# Patient Record
Sex: FEMALE | Race: BLACK OR AFRICAN AMERICAN | NOT HISPANIC OR LATINO | URBAN - METROPOLITAN AREA
[De-identification: names, ages, dates, MRNs, and addresses within clinical notes are randomized per-mention and may not be internally consistent; named-entity substitution may affect disease eponyms.]

---

## 2018-01-17 ENCOUNTER — EMERGENCY (EMERGENCY)
Facility: HOSPITAL | Age: 18
LOS: 1 days | Discharge: HOME | End: 2018-01-17
Attending: EMERGENCY MEDICINE | Admitting: EMERGENCY MEDICINE
Payer: MEDICAID

## 2018-01-17 VITALS
TEMPERATURE: 98 F | SYSTOLIC BLOOD PRESSURE: 131 MMHG | DIASTOLIC BLOOD PRESSURE: 78 MMHG | RESPIRATION RATE: 16 BRPM | OXYGEN SATURATION: 100 % | HEART RATE: 102 BPM

## 2018-01-17 VITALS
RESPIRATION RATE: 20 BRPM | TEMPERATURE: 99 F | DIASTOLIC BLOOD PRESSURE: 90 MMHG | WEIGHT: 229.94 LBS | HEART RATE: 105 BPM | SYSTOLIC BLOOD PRESSURE: 130 MMHG | OXYGEN SATURATION: 99 %

## 2018-01-17 LAB
ALBUMIN SERPL ELPH-MCNC: 3.9 G/DL — SIGNIFICANT CHANGE UP (ref 3.3–5)
ALP SERPL-CCNC: 56 U/L — SIGNIFICANT CHANGE UP (ref 40–120)
ALT FLD-CCNC: 19 U/L — SIGNIFICANT CHANGE UP (ref 4–33)
AST SERPL-CCNC: 22 U/L — SIGNIFICANT CHANGE UP (ref 4–32)
BASOPHILS # BLD AUTO: 0.04 K/UL — SIGNIFICANT CHANGE UP (ref 0–0.2)
BASOPHILS NFR BLD AUTO: 0.3 % — SIGNIFICANT CHANGE UP (ref 0–2)
BILIRUB SERPL-MCNC: 0.3 MG/DL — SIGNIFICANT CHANGE UP (ref 0.2–1.2)
BUN SERPL-MCNC: 9 MG/DL — SIGNIFICANT CHANGE UP (ref 7–23)
CALCIUM SERPL-MCNC: 8.6 MG/DL — SIGNIFICANT CHANGE UP (ref 8.4–10.5)
CHLORIDE SERPL-SCNC: 102 MMOL/L — SIGNIFICANT CHANGE UP (ref 98–107)
CK MB BLD-MCNC: 0.6 — SIGNIFICANT CHANGE UP (ref 0–2.5)
CK MB BLD-MCNC: 1.71 NG/ML — SIGNIFICANT CHANGE UP (ref 1–4.7)
CK SERPL-CCNC: 276 U/L — HIGH (ref 25–170)
CO2 SERPL-SCNC: 21 MMOL/L — LOW (ref 22–31)
CREAT SERPL-MCNC: 0.92 MG/DL — SIGNIFICANT CHANGE UP (ref 0.5–1.3)
D DIMER BLD IA.RAPID-MCNC: < 150 NG/ML — SIGNIFICANT CHANGE UP
EOSINOPHIL # BLD AUTO: 0.07 K/UL — SIGNIFICANT CHANGE UP (ref 0–0.5)
EOSINOPHIL NFR BLD AUTO: 0.5 % — SIGNIFICANT CHANGE UP (ref 0–6)
GLUCOSE SERPL-MCNC: 78 MG/DL — SIGNIFICANT CHANGE UP (ref 70–99)
HCT VFR BLD CALC: 36 % — SIGNIFICANT CHANGE UP (ref 34.5–45)
HGB BLD-MCNC: 11.9 G/DL — SIGNIFICANT CHANGE UP (ref 11.5–15.5)
IMM GRANULOCYTES # BLD AUTO: 0.03 # — SIGNIFICANT CHANGE UP
IMM GRANULOCYTES NFR BLD AUTO: 0.2 % — SIGNIFICANT CHANGE UP (ref 0–1.5)
LYMPHOCYTES # BLD AUTO: 26.8 % — SIGNIFICANT CHANGE UP (ref 13–44)
LYMPHOCYTES # BLD AUTO: 3.6 K/UL — HIGH (ref 1–3.3)
MCHC RBC-ENTMCNC: 29.9 PG — SIGNIFICANT CHANGE UP (ref 27–34)
MCHC RBC-ENTMCNC: 33.1 % — SIGNIFICANT CHANGE UP (ref 32–36)
MCV RBC AUTO: 90.5 FL — SIGNIFICANT CHANGE UP (ref 80–100)
MONOCYTES # BLD AUTO: 0.67 K/UL — SIGNIFICANT CHANGE UP (ref 0–0.9)
MONOCYTES NFR BLD AUTO: 5 % — SIGNIFICANT CHANGE UP (ref 2–14)
NEUTROPHILS # BLD AUTO: 9.02 K/UL — HIGH (ref 1.8–7.4)
NEUTROPHILS NFR BLD AUTO: 67.2 % — SIGNIFICANT CHANGE UP (ref 43–77)
NRBC # FLD: 0 — SIGNIFICANT CHANGE UP
PLATELET # BLD AUTO: 345 K/UL — SIGNIFICANT CHANGE UP (ref 150–400)
PMV BLD: 9.3 FL — SIGNIFICANT CHANGE UP (ref 7–13)
POTASSIUM SERPL-MCNC: 4.3 MMOL/L — SIGNIFICANT CHANGE UP (ref 3.5–5.3)
POTASSIUM SERPL-SCNC: 4.3 MMOL/L — SIGNIFICANT CHANGE UP (ref 3.5–5.3)
PROT SERPL-MCNC: 7.1 G/DL — SIGNIFICANT CHANGE UP (ref 6–8.3)
RBC # BLD: 3.98 M/UL — SIGNIFICANT CHANGE UP (ref 3.8–5.2)
RBC # FLD: 13.2 % — SIGNIFICANT CHANGE UP (ref 10.3–14.5)
SODIUM SERPL-SCNC: 138 MMOL/L — SIGNIFICANT CHANGE UP (ref 135–145)
TROPONIN T SERPL-MCNC: < 0.06 NG/ML — SIGNIFICANT CHANGE UP (ref 0–0.06)
WBC # BLD: 13.43 K/UL — HIGH (ref 3.8–10.5)
WBC # FLD AUTO: 13.43 K/UL — HIGH (ref 3.8–10.5)

## 2018-01-17 PROCEDURE — 99285 EMERGENCY DEPT VISIT HI MDM: CPT | Mod: 25

## 2018-01-17 PROCEDURE — 93010 ELECTROCARDIOGRAM REPORT: CPT

## 2018-01-17 PROCEDURE — 71046 X-RAY EXAM CHEST 2 VIEWS: CPT | Mod: 26

## 2018-01-17 RX ORDER — AZITHROMYCIN 500 MG/1
1 TABLET, FILM COATED ORAL
Qty: 1 | Refills: 0 | OUTPATIENT
Start: 2018-01-17

## 2018-01-17 RX ORDER — SODIUM CHLORIDE 9 MG/ML
1000 INJECTION INTRAMUSCULAR; INTRAVENOUS; SUBCUTANEOUS ONCE
Qty: 0 | Refills: 0 | Status: COMPLETED | OUTPATIENT
Start: 2018-01-17 | End: 2018-01-17

## 2018-01-17 RX ORDER — ACETAMINOPHEN 500 MG
975 TABLET ORAL ONCE
Qty: 0 | Refills: 0 | Status: COMPLETED | OUTPATIENT
Start: 2018-01-17 | End: 2018-01-17

## 2018-01-17 RX ADMIN — SODIUM CHLORIDE 1000 MILLILITER(S): 9 INJECTION INTRAMUSCULAR; INTRAVENOUS; SUBCUTANEOUS at 11:27

## 2018-01-17 RX ADMIN — Medication 975 MILLIGRAM(S): at 13:40

## 2018-01-17 NOTE — ED PROVIDER NOTE - MEDICAL DECISION MAKING DETAILS
Chest pain, shortness of breath- PERC +, low risk, will do dimer to r/o PE, cbc, cmp, cxr, ekg abnormal, no old

## 2018-01-17 NOTE — ED PROVIDER NOTE - OBJECTIVE STATEMENT
17 year old female, PMHx of thickened endometrium (on medication, unsure of name), presents to the ED complaining of chest pain and shortness of breath. Patient states yesterday evening, she was at rest and had sudden onset substernal chest discomfort associated with shortness of breath. She states the pain is constant, sharp, substernal, non-radiating, pleuritic in nature. It is not associated with exertion. She endorses resting SOB, constant which she attributes to difficulty taking a deep breath due to the pain. She states this morning around 2 am she woke up with the discomfort and sob which was then associated with lightheadedness, chills and a non-productive cough. Pt endorses months of intermittent nausea, no vomiting. The symptoms persisted, prompting her to come to the ED. No h/o personal or family clots, long car rides/travel, tobacco use, fevers, v/d or other complaints. 17 year old female, PMHx of thickened endometrium (on medication, unsure of name), presents to the ED complaining of chest pain and shortness of breath. Patient states yesterday evening, she was at rest and had sudden onset substernal chest discomfort associated with shortness of breath. She states the pain is constant, sharp, substernal, non-radiating, pleuritic in nature. It is not associated with exertion. She endorses resting SOB, constant which she attributes to difficulty taking a deep breath due to the pain. She states this morning around 2 am she woke up with the discomfort and sob which was then associated with lightheadedness, chills and a non-productive cough. Pt endorses months of intermittent nausea, no vomiting. The symptoms persisted, prompting her to come to the ED. No h/o personal or family clots, long car rides/travel, tobacco use, fevers, v/d or other complaints.  Attending - Agree with above.  I evaluated patient myself.  16 y/o F with grandmother at bedside.  c/o SSCP and SOB since approx 2am.  Worse with taking deep breathe.  Never had before.  No recent trauma or illness.  Denies fever.  Noted + cough.  No phlegm.  No rhinorrhea or congestion.  No abd pain.  Reports nausea on/off x approx 1 month.

## 2018-01-17 NOTE — ED PROVIDER NOTE - PHYSICAL EXAMINATION
ATTENDING PHYSICAL EXAM DR. Franz ***GEN - NAD; well appearing; A+O x3 ***HEAD - NC/AT ***EYES/NOSE - PERRL, EOMI, mucous membranes moist, no discharge ***THROAT: Oral cavity and pharynx normal. No inflammation, swelling, exudate, or lesions.  ***NECK: Neck supple, non-tender without lymphadenopathy, no masses, no JVD.   ***PULMONARY - CTA b/l, symmetric breath sounds. ***CARDIAC -s1s2, RRR, no M,R,G  ***ABDOMEN - +BS, ND, NT, soft, no guarding, no rebound, no masses   ***BACK - no CVA tenderness, Normal  spine ***EXTREMITIES - symmetric pulses, 2+ dp, capillary refill < 2 seconds, no clubbing, no cyanosis, no edema ***SKIN - no rash or bruising   ***NEUROLOGIC - alert, CN 2-12 intact

## 2018-01-17 NOTE — ED ADULT NURSE NOTE - OBJECTIVE STATEMENT
Pt rec'd to ED R#26 Aox4, in NAD, c/o midsternal chest pressure x2 days with dry cough. States chest pressure worse with ambulation. Also c/o dizziness, HA, nausea. Denies vomiting/ SOB/ abd pain/ dysuria/ fevers/ chills/ nasal congestion/ other acute complaints. VSS. Awaiting attg eval. Mom at bedside. Will continue to monitor.

## 2018-01-18 ENCOUNTER — EMERGENCY (EMERGENCY)
Facility: HOSPITAL | Age: 18
LOS: 1 days | Discharge: HOME | End: 2018-01-18
Attending: EMERGENCY MEDICINE | Admitting: EMERGENCY MEDICINE
Payer: MEDICAID

## 2018-01-18 VITALS
DIASTOLIC BLOOD PRESSURE: 101 MMHG | OXYGEN SATURATION: 100 % | HEART RATE: 124 BPM | SYSTOLIC BLOOD PRESSURE: 159 MMHG | TEMPERATURE: 101 F | RESPIRATION RATE: 16 BRPM

## 2018-01-18 VITALS
DIASTOLIC BLOOD PRESSURE: 53 MMHG | TEMPERATURE: 99 F | HEART RATE: 113 BPM | RESPIRATION RATE: 24 BRPM | OXYGEN SATURATION: 100 % | SYSTOLIC BLOOD PRESSURE: 126 MMHG

## 2018-01-18 LAB
ALBUMIN SERPL ELPH-MCNC: 4 G/DL — SIGNIFICANT CHANGE UP (ref 3.3–5)
ALP SERPL-CCNC: 58 U/L — SIGNIFICANT CHANGE UP (ref 40–120)
ALT FLD-CCNC: 20 U/L — SIGNIFICANT CHANGE UP (ref 4–33)
AST SERPL-CCNC: 25 U/L — SIGNIFICANT CHANGE UP (ref 4–32)
BASE EXCESS BLDV CALC-SCNC: 1.7 MMOL/L — SIGNIFICANT CHANGE UP
BASOPHILS # BLD AUTO: 0.03 K/UL — SIGNIFICANT CHANGE UP (ref 0–0.2)
BASOPHILS NFR BLD AUTO: 0.2 % — SIGNIFICANT CHANGE UP (ref 0–2)
BILIRUB SERPL-MCNC: 0.2 MG/DL — SIGNIFICANT CHANGE UP (ref 0.2–1.2)
BLOOD GAS VENOUS - CREATININE: 0.94 MG/DL — SIGNIFICANT CHANGE UP (ref 0.5–1.3)
BUN SERPL-MCNC: 8 MG/DL — SIGNIFICANT CHANGE UP (ref 7–23)
CALCIUM SERPL-MCNC: 8.7 MG/DL — SIGNIFICANT CHANGE UP (ref 8.4–10.5)
CHLORIDE BLDV-SCNC: 108 MMOL/L — SIGNIFICANT CHANGE UP (ref 96–108)
CHLORIDE SERPL-SCNC: 101 MMOL/L — SIGNIFICANT CHANGE UP (ref 98–107)
CK MB BLD-MCNC: 0.5 — SIGNIFICANT CHANGE UP (ref 0–2.5)
CK MB BLD-MCNC: 1.27 NG/ML — SIGNIFICANT CHANGE UP (ref 1–4.7)
CK SERPL-CCNC: 232 U/L — HIGH (ref 25–170)
CO2 SERPL-SCNC: 22 MMOL/L — SIGNIFICANT CHANGE UP (ref 22–31)
CREAT SERPL-MCNC: 0.9 MG/DL — SIGNIFICANT CHANGE UP (ref 0.5–1.3)
EOSINOPHIL # BLD AUTO: 0.05 K/UL — SIGNIFICANT CHANGE UP (ref 0–0.5)
EOSINOPHIL NFR BLD AUTO: 0.4 % — SIGNIFICANT CHANGE UP (ref 0–6)
GAS PNL BLDV: 131 MMOL/L — LOW (ref 136–146)
GLUCOSE BLDV-MCNC: 112 — HIGH (ref 70–99)
GLUCOSE SERPL-MCNC: 103 MG/DL — HIGH (ref 70–99)
HCO3 BLDV-SCNC: 25 MMOL/L — SIGNIFICANT CHANGE UP (ref 20–27)
HCT VFR BLD CALC: 36.3 % — SIGNIFICANT CHANGE UP (ref 34.5–45)
HCT VFR BLDV CALC: 36.7 % — SIGNIFICANT CHANGE UP (ref 34.5–45)
HGB BLD-MCNC: 11.8 G/DL — SIGNIFICANT CHANGE UP (ref 11.5–15.5)
HGB BLDV-MCNC: 11.9 G/DL — SIGNIFICANT CHANGE UP (ref 11.5–15.5)
IMM GRANULOCYTES # BLD AUTO: 0.04 # — SIGNIFICANT CHANGE UP
IMM GRANULOCYTES NFR BLD AUTO: 0.3 % — SIGNIFICANT CHANGE UP (ref 0–1.5)
LACTATE BLDV-MCNC: 1.3 MMOL/L — SIGNIFICANT CHANGE UP (ref 0.5–2)
LYMPHOCYTES # BLD AUTO: 1.87 K/UL — SIGNIFICANT CHANGE UP (ref 1–3.3)
LYMPHOCYTES # BLD AUTO: 13.6 % — SIGNIFICANT CHANGE UP (ref 13–44)
MCHC RBC-ENTMCNC: 28.6 PG — SIGNIFICANT CHANGE UP (ref 27–34)
MCHC RBC-ENTMCNC: 32.5 % — SIGNIFICANT CHANGE UP (ref 32–36)
MCV RBC AUTO: 88.1 FL — SIGNIFICANT CHANGE UP (ref 80–100)
MONOCYTES # BLD AUTO: 0.59 K/UL — SIGNIFICANT CHANGE UP (ref 0–0.9)
MONOCYTES NFR BLD AUTO: 4.3 % — SIGNIFICANT CHANGE UP (ref 2–14)
NEUTROPHILS # BLD AUTO: 11.14 K/UL — HIGH (ref 1.8–7.4)
NEUTROPHILS NFR BLD AUTO: 81.2 % — HIGH (ref 43–77)
NRBC # FLD: 0 — SIGNIFICANT CHANGE UP
PCO2 BLDV: 45 MMHG — SIGNIFICANT CHANGE UP (ref 41–51)
PH BLDV: 7.39 PH — SIGNIFICANT CHANGE UP (ref 7.32–7.43)
PLATELET # BLD AUTO: 351 K/UL — SIGNIFICANT CHANGE UP (ref 150–400)
PMV BLD: 9 FL — SIGNIFICANT CHANGE UP (ref 7–13)
PO2 BLDV: 33 MMHG — LOW (ref 35–40)
POTASSIUM BLDV-SCNC: 3.6 MMOL/L — SIGNIFICANT CHANGE UP (ref 3.4–4.5)
POTASSIUM SERPL-MCNC: 4 MMOL/L — SIGNIFICANT CHANGE UP (ref 3.5–5.3)
POTASSIUM SERPL-SCNC: 4 MMOL/L — SIGNIFICANT CHANGE UP (ref 3.5–5.3)
PROT SERPL-MCNC: 7.4 G/DL — SIGNIFICANT CHANGE UP (ref 6–8.3)
RBC # BLD: 4.12 M/UL — SIGNIFICANT CHANGE UP (ref 3.8–5.2)
RBC # FLD: 13.2 % — SIGNIFICANT CHANGE UP (ref 10.3–14.5)
SAO2 % BLDV: 56.7 % — LOW (ref 60–85)
SODIUM SERPL-SCNC: 137 MMOL/L — SIGNIFICANT CHANGE UP (ref 135–145)
TROPONIN T SERPL-MCNC: < 0.06 NG/ML — SIGNIFICANT CHANGE UP (ref 0–0.06)
WBC # BLD: 13.72 K/UL — HIGH (ref 3.8–10.5)
WBC # FLD AUTO: 13.72 K/UL — HIGH (ref 3.8–10.5)

## 2018-01-18 PROCEDURE — 93010 ELECTROCARDIOGRAM REPORT: CPT

## 2018-01-18 PROCEDURE — 99284 EMERGENCY DEPT VISIT MOD MDM: CPT | Mod: 25

## 2018-01-18 RX ORDER — IPRATROPIUM/ALBUTEROL SULFATE 18-103MCG
3 AEROSOL WITH ADAPTER (GRAM) INHALATION ONCE
Qty: 0 | Refills: 0 | Status: COMPLETED | OUTPATIENT
Start: 2018-01-18 | End: 2018-01-18

## 2018-01-18 RX ORDER — SODIUM CHLORIDE 9 MG/ML
2000 INJECTION INTRAMUSCULAR; INTRAVENOUS; SUBCUTANEOUS ONCE
Qty: 0 | Refills: 0 | Status: COMPLETED | OUTPATIENT
Start: 2018-01-18 | End: 2018-01-18

## 2018-01-18 RX ORDER — KETOROLAC TROMETHAMINE 30 MG/ML
15 SYRINGE (ML) INJECTION ONCE
Qty: 0 | Refills: 0 | Status: DISCONTINUED | OUTPATIENT
Start: 2018-01-18 | End: 2018-01-18

## 2018-01-18 RX ORDER — ALBUTEROL 90 UG/1
2 AEROSOL, METERED ORAL
Qty: 1 | Refills: 0 | OUTPATIENT
Start: 2018-01-18 | End: 2018-02-16

## 2018-01-18 RX ORDER — METOCLOPRAMIDE HCL 10 MG
10 TABLET ORAL ONCE
Qty: 0 | Refills: 0 | Status: COMPLETED | OUTPATIENT
Start: 2018-01-18 | End: 2018-01-18

## 2018-01-18 RX ORDER — SODIUM CHLORIDE 9 MG/ML
1000 INJECTION INTRAMUSCULAR; INTRAVENOUS; SUBCUTANEOUS ONCE
Qty: 0 | Refills: 0 | Status: COMPLETED | OUTPATIENT
Start: 2018-01-18 | End: 2018-01-18

## 2018-01-18 RX ADMIN — SODIUM CHLORIDE 2000 MILLILITER(S): 9 INJECTION INTRAMUSCULAR; INTRAVENOUS; SUBCUTANEOUS at 05:57

## 2018-01-18 RX ADMIN — SODIUM CHLORIDE 1000 MILLILITER(S): 9 INJECTION INTRAMUSCULAR; INTRAVENOUS; SUBCUTANEOUS at 07:33

## 2018-01-18 RX ADMIN — Medication 15 MILLIGRAM(S): at 06:14

## 2018-01-18 RX ADMIN — Medication 10 MILLIGRAM(S): at 05:57

## 2018-01-18 RX ADMIN — Medication 3 MILLILITER(S): at 06:02

## 2018-01-18 RX ADMIN — Medication 15 MILLIGRAM(S): at 05:57

## 2018-01-18 RX ADMIN — Medication 3 MILLILITER(S): at 06:14

## 2018-01-18 RX ADMIN — Medication 3 MILLILITER(S): at 06:08

## 2018-01-18 NOTE — ED ADULT NURSE NOTE - CHIEF COMPLAINT
The patient is a 17y Female complaining of fever x1 day, SOB, midsternal chest pain x3 days. Reports was seen in ED yesterday for similar complaint and was prescribed z-pack, and took x1 pill. Reports took tylenol 1000mg around 3am.

## 2018-01-18 NOTE — ED PROVIDER NOTE - PROGRESS NOTE DETAILS
IRMA Ryan: Pt signed out to me by overnight team, 17yF w/no pmhx dx yesterday with possible pneumonia started on azithromycin, she felt worse today with cough and sob, currently tachycardic, afebrile, EKG with TWI in precordial leads, will continue to monitor. Pt may require admission vs.CDU [place]: Pt now asymptomatic. SOB and CP resolved. Still slight tachycardia but improved. Will continue to hydrate. If remains feeling much better then can go home w/ outpt pmd/cards f/u, albuterol inhaler prn. Symptoms improved. Augmentin, inhaler and steriods sent to pharmacy.

## 2018-01-18 NOTE — ED ADULT NURSE NOTE - CHIEF COMPLAINT QUOTE
pt arrives w/ flu-like symptoms. pt states was here yesterday for same complaint and gvn a z-pack. pt states started pack and took one pill already but states symptoms getting worst.

## 2018-01-18 NOTE — ED ADULT NURSE NOTE - OBJECTIVE STATEMENT
Received pt in room 23, ambulatory, pt A&Ox4, respirations even and slightly labored b/l. Abdomen soft, nondistended, nontender. Sinus tach on cardiac monitor. IVL 20g Angiocath placed on left forearm. Awaiting MD bernal. Will continue to monitor.

## 2018-01-18 NOTE — ED PROVIDER NOTE - OBJECTIVE STATEMENT
18 yo woman p/w cough. Developed cough 3 days ago, non productive. Was seen in ED yesterday, had CXR w/ ?PNA and was given azithromycin (took first dose only). States since then she has gotten worse, now w/ fever, diffuse body aches and fevers. Feels nauseous w/ mild frontal headache. No vomiting or diarrhea. 18 yo woman p/w cough. Developed cough 3 days ago, non productive. Was seen in ED yesterday, had CXR w/ ?PNA and was given azithromycin (took first dose only). States since then she has gotten worse, now w/ fever, diffuse body aches and fevers. Feels nauseous w/ mild frontal headache. No vomiting or diarrhea.  [place]: 17F no PMH p/w cp/sob/fevers. For 3d, has non-productive cough, body aches, generalized intermittent gradual onset HAs. Seen in ED yesterday, slight leukocytosis, other labs (including d-dimer) wnl. CXR showing possible infiltrate, dc'd on z-pack. Returns today for worsening midsternal cp and SOB. Also now has fevers. Denies LE pain/swelling, black/bloody stool, rashes, sick contacts, recent travel. Tylenol 1g ~1hr PTA.

## 2018-01-18 NOTE — ED PROVIDER NOTE - ATTENDING CONTRIBUTION TO CARE
17F no PMH p/w cp/sob/fevers. For 3d, has non-productive cough, body aches, generalized intermittent gradual onset HAs. Seen in ED yesterday, slight leukocytosis, other labs wnl. CXR showing possible infiltrate, dc'd on z-pack. Returns today for worsening midsternal cp and SOB. Also now has fevers. Tachycardic and febrile, also slight tachypnea, other vitals wnl. Exam as above. EKG sinus tach w/ twi.  ddx: Likely URI vs. PNa vs. low suspicion for myocarditis.  Given EKG, pt's symptoms and possible ultrasound finding, will check ck/troponin. Basic labs, vbg comp. Symptom control, trial nebs, reassess.   If symptoms do not resolve, may require admission for further w/u and supportive care.

## 2018-08-13 ENCOUNTER — EMERGENCY (EMERGENCY)
Facility: HOSPITAL | Age: 18
LOS: 0 days | Discharge: HOME | End: 2018-08-14
Attending: EMERGENCY MEDICINE
Payer: MEDICAID

## 2018-08-13 VITALS
WEIGHT: 229.94 LBS | HEIGHT: 65 IN | SYSTOLIC BLOOD PRESSURE: 140 MMHG | OXYGEN SATURATION: 99 % | DIASTOLIC BLOOD PRESSURE: 78 MMHG | TEMPERATURE: 98 F | HEART RATE: 99 BPM | RESPIRATION RATE: 18 BRPM

## 2018-08-13 DIAGNOSIS — M54.12 RADICULOPATHY, CERVICAL REGION: ICD-10-CM

## 2018-08-13 DIAGNOSIS — R20.0 ANESTHESIA OF SKIN: ICD-10-CM

## 2018-08-13 PROCEDURE — 99284 EMERGENCY DEPT VISIT MOD MDM: CPT

## 2018-08-13 RX ORDER — IBUPROFEN 200 MG
600 TABLET ORAL ONCE
Qty: 0 | Refills: 0 | Status: COMPLETED | OUTPATIENT
Start: 2018-08-13 | End: 2018-08-13

## 2018-08-13 RX ORDER — METHOCARBAMOL 500 MG/1
1500 TABLET, FILM COATED ORAL ONCE
Qty: 0 | Refills: 0 | Status: COMPLETED | OUTPATIENT
Start: 2018-08-13 | End: 2018-08-13

## 2018-08-13 NOTE — ED PROVIDER NOTE - OBJECTIVE STATEMENT
18 year old female with no significant PMH presenting to ED due to new onset neck/shoulder pain with some numbness radiating down bilateral hands - states occurred while she was packing some things away - denies any trauma, no arm weakness. Denies any headache or other focal weakness/numbness.

## 2018-08-13 NOTE — ED PROVIDER NOTE - NEUROLOGICAL, MLM
Alert and oriented, no focal deficits, no motor or sensory deficits. 5/5 bilateral arm strength normal, NIHSS 0.

## 2018-08-14 VITALS
RESPIRATION RATE: 18 BRPM | OXYGEN SATURATION: 99 % | DIASTOLIC BLOOD PRESSURE: 78 MMHG | SYSTOLIC BLOOD PRESSURE: 131 MMHG | HEART RATE: 71 BPM | TEMPERATURE: 98 F

## 2018-08-14 PROCEDURE — 76377 3D RENDER W/INTRP POSTPROCES: CPT | Mod: 26

## 2018-08-14 PROCEDURE — 72125 CT NECK SPINE W/O DYE: CPT | Mod: 26

## 2018-08-14 RX ORDER — METHOCARBAMOL 500 MG/1
2 TABLET, FILM COATED ORAL
Qty: 30 | Refills: 0 | OUTPATIENT
Start: 2018-08-14 | End: 2018-08-18

## 2018-08-14 RX ORDER — IBUPROFEN 200 MG
1 TABLET ORAL
Qty: 20 | Refills: 0 | OUTPATIENT
Start: 2018-08-14 | End: 2018-08-18

## 2018-08-14 RX ADMIN — METHOCARBAMOL 1500 MILLIGRAM(S): 500 TABLET, FILM COATED ORAL at 00:10

## 2018-08-14 RX ADMIN — Medication 600 MILLIGRAM(S): at 00:10

## 2018-08-14 NOTE — ED ADULT NURSE NOTE - OBJECTIVE STATEMENT
Patient complains of sharp pain and numbness in the hand, primarily in the thumbs. Patient reports sensation went up the arms into shoulders. Denies chest pain and shortness of breath.

## 2018-08-14 NOTE — ED ADULT NURSE NOTE - NSIMPLEMENTINTERV_GEN_ALL_ED
Implemented All Universal Safety Interventions:  [place] to call system. Call bell, personal items and telephone within reach. Instruct patient to call for assistance. Room bathroom lighting operational. Non-slip footwear when patient is off stretcher. Physically safe environment: no spills, clutter or unnecessary equipment. Stretcher in lowest position, wheels locked, appropriate side rails in place.

## 2018-08-31 ENCOUNTER — INPATIENT (INPATIENT)
Facility: HOSPITAL | Age: 18
LOS: 1 days | Discharge: HOME | End: 2018-09-02
Attending: HOSPITALIST | Admitting: HOSPITALIST
Payer: MEDICAID

## 2018-08-31 VITALS
DIASTOLIC BLOOD PRESSURE: 76 MMHG | RESPIRATION RATE: 16 BRPM | HEART RATE: 93 BPM | TEMPERATURE: 99 F | SYSTOLIC BLOOD PRESSURE: 123 MMHG | OXYGEN SATURATION: 98 %

## 2018-08-31 DIAGNOSIS — R25.2 CRAMP AND SPASM: ICD-10-CM

## 2018-08-31 DIAGNOSIS — Z29.9 ENCOUNTER FOR PROPHYLACTIC MEASURES, UNSPECIFIED: ICD-10-CM

## 2018-08-31 DIAGNOSIS — L83 ACANTHOSIS NIGRICANS: ICD-10-CM

## 2018-08-31 DIAGNOSIS — M62.89 OTHER SPECIFIED DISORDERS OF MUSCLE: ICD-10-CM

## 2018-08-31 LAB
ALBUMIN SERPL ELPH-MCNC: 4.4 G/DL — SIGNIFICANT CHANGE UP (ref 3.3–5)
ALP SERPL-CCNC: 57 U/L — SIGNIFICANT CHANGE UP (ref 40–120)
ALT FLD-CCNC: 17 U/L — SIGNIFICANT CHANGE UP (ref 4–33)
AMPHET UR-MCNC: NEGATIVE — SIGNIFICANT CHANGE UP
APPEARANCE UR: SIGNIFICANT CHANGE UP
AST SERPL-CCNC: 29 U/L — SIGNIFICANT CHANGE UP (ref 4–32)
BACTERIA # UR AUTO: SIGNIFICANT CHANGE UP
BARBITURATES UR SCN-MCNC: NEGATIVE — SIGNIFICANT CHANGE UP
BENZODIAZ UR-MCNC: NEGATIVE — SIGNIFICANT CHANGE UP
BILIRUB SERPL-MCNC: 0.2 MG/DL — SIGNIFICANT CHANGE UP (ref 0.2–1.2)
BILIRUB UR-MCNC: NEGATIVE — SIGNIFICANT CHANGE UP
BLOOD UR QL VISUAL: HIGH
BUN SERPL-MCNC: 13 MG/DL — SIGNIFICANT CHANGE UP (ref 7–23)
CA-I BLD-SCNC: 1.19 MMOL/L — SIGNIFICANT CHANGE UP (ref 1.03–1.23)
CALCIUM SERPL-MCNC: 9.3 MG/DL — SIGNIFICANT CHANGE UP (ref 8.4–10.5)
CANNABINOIDS UR-MCNC: NEGATIVE — SIGNIFICANT CHANGE UP
CHLORIDE SERPL-SCNC: 104 MMOL/L — SIGNIFICANT CHANGE UP (ref 98–107)
CK SERPL-CCNC: 1096 U/L — HIGH (ref 25–170)
CO2 SERPL-SCNC: 24 MMOL/L — SIGNIFICANT CHANGE UP (ref 22–31)
COCAINE METAB.OTHER UR-MCNC: NEGATIVE — SIGNIFICANT CHANGE UP
COLOR SPEC: YELLOW — SIGNIFICANT CHANGE UP
CREAT SERPL-MCNC: 0.93 MG/DL — SIGNIFICANT CHANGE UP (ref 0.5–1.3)
CRP SERPL-MCNC: 4.8 MG/L — SIGNIFICANT CHANGE UP
ERYTHROCYTE [SEDIMENTATION RATE] IN BLOOD: 16 MM/HR — SIGNIFICANT CHANGE UP (ref 4–25)
GLUCOSE SERPL-MCNC: 67 MG/DL — LOW (ref 70–99)
GLUCOSE UR-MCNC: NEGATIVE — SIGNIFICANT CHANGE UP
HCT VFR BLD CALC: 35.9 % — SIGNIFICANT CHANGE UP (ref 34.5–45)
HGB BLD-MCNC: 11.7 G/DL — SIGNIFICANT CHANGE UP (ref 11.5–15.5)
HYALINE CASTS # UR AUTO: HIGH
KETONES UR-MCNC: HIGH
LEUKOCYTE ESTERASE UR-ACNC: SIGNIFICANT CHANGE UP
MAGNESIUM SERPL-MCNC: 2.1 MG/DL — SIGNIFICANT CHANGE UP (ref 1.6–2.6)
MCHC RBC-ENTMCNC: 28.7 PG — SIGNIFICANT CHANGE UP (ref 27–34)
MCHC RBC-ENTMCNC: 32.6 % — SIGNIFICANT CHANGE UP (ref 32–36)
MCV RBC AUTO: 88 FL — SIGNIFICANT CHANGE UP (ref 80–100)
METHADONE UR-MCNC: NEGATIVE — SIGNIFICANT CHANGE UP
NITRITE UR-MCNC: NEGATIVE — SIGNIFICANT CHANGE UP
NRBC # FLD: 0 — SIGNIFICANT CHANGE UP
OPIATES UR-MCNC: NEGATIVE — SIGNIFICANT CHANGE UP
OXYCODONE UR-MCNC: NEGATIVE — SIGNIFICANT CHANGE UP
PCP UR-MCNC: NEGATIVE — SIGNIFICANT CHANGE UP
PH UR: 6 — SIGNIFICANT CHANGE UP (ref 5–8)
PLATELET # BLD AUTO: 353 K/UL — SIGNIFICANT CHANGE UP (ref 150–400)
PMV BLD: 8.9 FL — SIGNIFICANT CHANGE UP (ref 7–13)
POTASSIUM SERPL-MCNC: 4.1 MMOL/L — SIGNIFICANT CHANGE UP (ref 3.5–5.3)
POTASSIUM SERPL-SCNC: 4.1 MMOL/L — SIGNIFICANT CHANGE UP (ref 3.5–5.3)
PROT SERPL-MCNC: 7.7 G/DL — SIGNIFICANT CHANGE UP (ref 6–8.3)
PROT UR-MCNC: HIGH
RBC # BLD: 4.08 M/UL — SIGNIFICANT CHANGE UP (ref 3.8–5.2)
RBC # FLD: 13.6 % — SIGNIFICANT CHANGE UP (ref 10.3–14.5)
RBC CASTS # UR COMP ASSIST: HIGH (ref 0–?)
SODIUM SERPL-SCNC: 140 MMOL/L — SIGNIFICANT CHANGE UP (ref 135–145)
SP GR SPEC: 1.04 — SIGNIFICANT CHANGE UP (ref 1–1.04)
SQUAMOUS # UR AUTO: SIGNIFICANT CHANGE UP
TSH SERPL-MCNC: 1.55 UIU/ML — SIGNIFICANT CHANGE UP (ref 0.27–4.2)
UROBILINOGEN FLD QL: NORMAL — SIGNIFICANT CHANGE UP
WBC # BLD: 6.83 K/UL — SIGNIFICANT CHANGE UP (ref 3.8–10.5)
WBC # FLD AUTO: 6.83 K/UL — SIGNIFICANT CHANGE UP (ref 3.8–10.5)
WBC UR QL: >50 — HIGH (ref 0–?)

## 2018-08-31 PROCEDURE — 72141 MRI NECK SPINE W/O DYE: CPT | Mod: 26

## 2018-08-31 PROCEDURE — 70553 MRI BRAIN STEM W/O & W/DYE: CPT | Mod: 26

## 2018-08-31 PROCEDURE — 93971 EXTREMITY STUDY: CPT | Mod: 26,LT

## 2018-08-31 PROCEDURE — 99223 1ST HOSP IP/OBS HIGH 75: CPT

## 2018-08-31 RX ORDER — ALBUTEROL 90 UG/1
2 AEROSOL, METERED ORAL
Qty: 0 | Refills: 0 | COMMUNITY

## 2018-08-31 RX ORDER — SODIUM CHLORIDE 9 MG/ML
1000 INJECTION INTRAMUSCULAR; INTRAVENOUS; SUBCUTANEOUS ONCE
Qty: 0 | Refills: 0 | Status: COMPLETED | OUTPATIENT
Start: 2018-08-31 | End: 2018-08-31

## 2018-08-31 RX ORDER — CHOLECALCIFEROL (VITAMIN D3) 125 MCG
2000 CAPSULE ORAL DAILY
Qty: 0 | Refills: 0 | Status: DISCONTINUED | OUTPATIENT
Start: 2018-08-31 | End: 2018-09-02

## 2018-08-31 RX ORDER — ERGOCALCIFEROL 1.25 MG/1
1 CAPSULE ORAL
Qty: 0 | Refills: 0 | COMMUNITY

## 2018-08-31 RX ORDER — BACLOFEN 100 %
10 POWDER (GRAM) MISCELLANEOUS ONCE
Qty: 0 | Refills: 0 | Status: COMPLETED | OUTPATIENT
Start: 2018-08-31 | End: 2018-08-31

## 2018-08-31 RX ORDER — KETOROLAC TROMETHAMINE 30 MG/ML
15 SYRINGE (ML) INJECTION ONCE
Qty: 0 | Refills: 0 | Status: DISCONTINUED | OUTPATIENT
Start: 2018-08-31 | End: 2018-08-31

## 2018-08-31 RX ORDER — ENOXAPARIN SODIUM 100 MG/ML
40 INJECTION SUBCUTANEOUS EVERY 24 HOURS
Qty: 0 | Refills: 0 | Status: DISCONTINUED | OUTPATIENT
Start: 2018-08-31 | End: 2018-09-01

## 2018-08-31 RX ADMIN — Medication 10 MILLIGRAM(S): at 18:14

## 2018-08-31 RX ADMIN — SODIUM CHLORIDE 1000 MILLILITER(S): 9 INJECTION INTRAMUSCULAR; INTRAVENOUS; SUBCUTANEOUS at 17:44

## 2018-08-31 RX ADMIN — Medication 2000 UNIT(S): at 21:42

## 2018-08-31 RX ADMIN — SODIUM CHLORIDE 1000 MILLILITER(S): 9 INJECTION INTRAMUSCULAR; INTRAVENOUS; SUBCUTANEOUS at 22:15

## 2018-08-31 RX ADMIN — Medication 15 MILLIGRAM(S): at 15:28

## 2018-08-31 RX ADMIN — ENOXAPARIN SODIUM 40 MILLIGRAM(S): 100 INJECTION SUBCUTANEOUS at 21:42

## 2018-08-31 NOTE — CONSULT NOTE ADULT - SUBJECTIVE AND OBJECTIVE BOX
Neurology Consult    Reason for consult: L leg cramps    HPI: Patient is a 18 year old AA female presenting with left leg cramps. Patient has no PMH, taking only vitamin D. States she woke up this morning with cramping in the left calf. She tried to stand up and felt severe stabbing pain in the left calf. Reports that 2 weeks ago, she was at work when her left thumb contracted and she felt the same severe stabbing pain in the forearm. This lasted about 30 minutes and then the same thing happened to the right arm. CT cervical spine done at that time was unremarkable. Patient denies any recent travel, exercise, smoking, alcohol, drug use, numbness, tingling, weakness, changes in vision, back pain.    REVIEW OF SYSTEMS:  Constitutional: No fever, chills, fatigue, weakness.  Eyes: No eye pain, visual disturbances, or discharge.  ENT:  No difficulty hearing, tinnitus, vertigo. No sinus or throat pain.  Neck: No pain or stiffness.  Respiratory: No cough, dyspnea, wheezing.  Cardiovascular: No chest pain, palpitations.  Gastrointestinal: No abdominal pain. No nausea, vomiting, diarrhea, or constipation.   Genitourinary: No dysuria, frequency, hematuria or incontinence.  Neurological: No headaches, lightheadedness, vertigo, numbness or tremors.  Psychiatric: No depression, anxiety, mood swings, or difficulty sleeping.  Musculoskeletal: No joint pain or swelling. No muscle, back. Left leg pain.  Skin: No itching, burning, rashes or lesions.   Lymph Nodes: No enlarged glands  Endocrine: No heat or cold intolerance, no hair loss.  Allergy and Immunologic: No hives or eczema.    MEDICATIONS    PMH: No pertinent past medical history     PSH: No significant past surgical history    FAMILY HISTORY:  No history of dementia, strokes, or seizures     SOCIAL HISTORY:  No history of tobacco or alcohol use     Allergies  No Known Drug Allergies  seasonal (Rhinitis; Rhinorrhea; Eye Irritation)    Vital Signs Last 24 Hrs  T(C): 36.4 (31 Aug 2018 16:28), Max: 37.3 (31 Aug 2018 13:50)  T(F): 97.6 (31 Aug 2018 16:28), Max: 99.2 (31 Aug 2018 13:50)  HR: 74 (31 Aug 2018 16:28) (74 - 93)  BP: 113/71 (31 Aug 2018 16:28) (113/71 - 123/76)  RR: 16 (31 Aug 2018 16:28) (16 - 16)  SpO2: 99% (31 Aug 2018 16:28) (98% - 99%)    Neurological Examination:    Mental Status: Patient is alert, awake, oriented X3. Patient is fluent, no dysarthria, no aphasia. Follows commands well and able to answer questions appropriately. Mood and affect normal.    Cranial Nerves: PERRL, EOMI, visual field intact, V1-V3 intact, no gross facial asymmetry, tongue/uvula midline    Motor Exam: No drift  Right upper extremity: 5/5  Left upper extremity: 5/5  Right lower extremity: 5/5  Left lower extremity: 5/5    Normal bulk/tone    Sensory: Intact to light touch and pinprick bilaterally. No extinction.     Coordination: Finger to nose intact bilaterally     Gait: Unable to walk due to pain in left leg    Reflexes: Bilateral 2+ Biceps, Brachial, Patellar, Ankle  Plantar: Down bilateral    GENERAL: No acute distress  HEENT:  Normocephalic, atraumatic  EXTREMITIES: No edema, clubbing, cyanosis  MUSCULOSKELETAL: Normal range of motion. No erythema or swelling. Left calf tender to palpation. Pain with foot dorsiflexion.  SKIN: No rashes    LABS:  CBC Full  -  ( 31 Aug 2018 14:45 )  WBC Count : 6.83 K/uL  Hemoglobin : 11.7 g/dL  Hematocrit : 35.9 %  Platelet Count - Automated : 353 K/uL  Mean Cell Volume : 88.0 fL  Mean Cell Hemoglobin : 28.7 pg  Mean Cell Hemoglobin Concentration : 32.6 %    08-31    140  |  104  |  13  ----------------------------<  67<L>  4.1   |  24  |  0.93    Ca    9.3      31 Aug 2018 14:45  Mg     2.1     08-31    TPro  7.7  /  Alb  4.4  /  TBili  0.2  /  DBili  x   /  AST  29  /  ALT  17  /  AlkPhos  57  08-31    LIVER FUNCTIONS - ( 31 Aug 2018 14:45 )  Alb: 4.4 g/dL / Pro: 7.7 g/dL / ALK PHOS: 57 u/L / ALT: 17 u/L / AST: 29 u/L / GGT: x

## 2018-08-31 NOTE — ED PROVIDER NOTE - OBJECTIVE STATEMENT
18 year old female c/o spasm of left leg , unable to walk, had episode of hand spasm last week with normal ct cervical spine. no hx of injury, no drug or medicine use, no numbness.

## 2018-08-31 NOTE — ED ADULT NURSE NOTE - OBJECTIVE STATEMENT
pt states she developed cramp in lt leg at 9am this am. states pain in lt leg is severe when she tries to walk. denies trauma. sl placed labs sent. ucg done, medicated as ordered to RW.

## 2018-08-31 NOTE — ED ADULT TRIAGE NOTE - CHIEF COMPLAINT QUOTE
A&OX3 arrives in wheelchair c/o left leg pain that began this morning, pt states over past two weeks has been having intermittent muscles spasms throughout her whole body but this morning pain started in left leg and is now unable to walk and stand, denies numbness and tingling recent fall or trauma to area no swelling noted to lower leg area

## 2018-08-31 NOTE — H&P ADULT - PROBLEM SELECTOR PLAN 2
darkening of skin over posterior neck  -will check a1c in the am Darkening of skin over posterior neck, concerning for insulin resistance and/or metabolic syndrome  -will check HgbA1c in the AM

## 2018-08-31 NOTE — H&P ADULT - PROBLEM SELECTOR PLAN 4
- VTE ppx: No pharmacologic ppx, as pt is low risk with IMPROVE score 0. Encourage OOB and ambulation.  IMPROVE VTE Individual Risk Assessment  RISK                                                                Points  [  ] Previous VTE                                                  3  [  ] Thrombophilia                                               2  [  ] Lower limb paralysis                                      2        (unable to hold up >15 seconds)    [  ] Current Cancer                                              2         (within 6 months)  [  ] Immobilization > 24 hrs                                1  [  ] ICU/CCU stay > 24 hours                              1  [  ] Age > 60                                                      1  IMPROVE VTE Score ____0____    - Diet: regular    OTTO Mckeon MD-PGY2  Internal Medicine Department  Pager: [phone] / [phone]

## 2018-08-31 NOTE — ED ADULT NURSE NOTE - NSIMPLEMENTINTERV_GEN_ALL_ED
Implemented All Universal Safety Interventions:  [place] to call system. Call bell, personal items and telephone within reach. Instruct patient to call for assistance. Room bathroom lighting operational. Non-slip footwear when patient is off stretcher. Physically safe environment: no spills, clutter or unnecessary equipment. Stretcher in lowest position, wheels locked, appropriate side rails in place.

## 2018-08-31 NOTE — H&P ADULT - PROBLEM SELECTOR PROBLEM 1
Problem: Patient Care Overview (Adult)  Goal: Adult Individualization and Mutuality  Outcome: Ongoing (interventions implemented as appropriate)       [place] Muscle cramp

## 2018-08-31 NOTE — H&P ADULT - NSHPPHYSICALEXAM_GEN_ALL_CORE
T(C): 36.4 (08-31-18 @ 16:28), Max: 37.3 (08-31-18 @ 13:50)  HR: 74 (08-31-18 @ 16:28) (74 - 93)  BP: 113/71 (08-31-18 @ 16:28) (113/71 - 123/76)  RR: 16 (08-31-18 @ 16:28) (16 - 16)  SpO2: 99% (08-31-18 @ 16:28) (98% - 99%)    GENERAL: NAD, well-developed  HEAD:  Atraumatic, Normocephalic  EYES: EOMI, PERRLA, conjunctiva and sclera clear  NECK: Supple, No JVD  CHEST/LUNG: Clear to auscultation bilaterally; No wheeze  HEART: Regular rate and rhythm; No murmurs, rubs, or gallops  ABDOMEN: Soft, Nontender, Nondistended; Bowel sounds present  EXTREMITIES:  2+ Peripheral Pulses, No clubbing, cyanosis, or edema  PSYCH: AAOx3  NEUROLOGY: non-focal  SKIN: slight acanthosis nigrans on back of neck T(C): 36.4 (08-31-18 @ 16:28), Max: 37.3 (08-31-18 @ 13:50)  HR: 74 (08-31-18 @ 16:28) (74 - 93)  BP: 113/71 (08-31-18 @ 16:28) (113/71 - 123/76)  RR: 16 (08-31-18 @ 16:28) (16 - 16)  SpO2: 99% (08-31-18 @ 16:28) (98% - 99%)    GENERAL: NAD, well-developed  HEAD: Atraumatic, Normocephalic  EYES: EOMI, PERRL, conjunctiva and sclera clear  NECK: Supple, No JVD, No LAD  CHEST/LUNG: Good inspiratory effort. Clear to auscultation bilaterally. No wheezes  HEART: Regular rate and rhythm; Normal S1 and S2; No murmurs, rubs, or gallops  ABDOMEN: Soft, Nontender, Nondistended; Bowel sounds present  EXTREMITIES:  2+ Peripheral Pulses, No cyanosis  PSYCH: Mood normal, full affect  NEUROLOGY: AA&Ox3, muscle strength 5/5 in b/l UEs and LEs, sensation intact in b/l UEs and LEs  SKIN: Warm and dry, slight acanthosis nigrans on posterior neck

## 2018-08-31 NOTE — H&P ADULT - NSHPSOCIALHISTORY_GEN_ALL_CORE
Lives at home with grandparents. Works at [employer].   Denies toxic habits. Family history: Personally reviewed with patient, and no pertinent family history for pt's current condition    Lives at home with grandparents. Works at [employer].   Denies tobacco, EtOH, and illicit drug use.

## 2018-08-31 NOTE — H&P ADULT - PROBLEM SELECTOR PLAN 3
VTE: lovenox  Diet: regular    OTTO MD Mike-PGY2  Internal Medicine Department  Pager: [phone] / [phone] - VTE ppx: No pharmacologic ppx, as pt is low risk with IMPROVE score 0. Encourage OOB and ambulation.  IMPROVE VTE Individual Risk Assessment  RISK                                                                Points  [  ] Previous VTE                                                  3  [  ] Thrombophilia                                               2  [  ] Lower limb paralysis                                      2        (unable to hold up >15 seconds)    [  ] Current Cancer                                              2         (within 6 months)  [  ] Immobilization > 24 hrs                                1  [  ] ICU/CCU stay > 24 hours                              1  [  ] Age > 60                                                      1  IMPROVE VTE Score ____0____    - Diet: regular    OTTO Mckeon MD-PGY2  Internal Medicine Department  Pager: [phone] / [phone] Pt with moderate amount of protein on UA.  - Will check spot urine protein to creatinine ratio

## 2018-08-31 NOTE — H&P ADULT - NSHPREVIEWOFSYSTEMS_GEN_ALL_CORE
CONSTITUTIONAL: No weakness, fevers or chills  EYES/ENT: No visual changes;  No vertigo or throat pain   NECK: No pain or stiffness  RESPIRATORY: No cough, wheezing, hemoptysis; No shortness of breath  CARDIOVASCULAR: No chest pain or palpitations  GASTROINTESTINAL: No abdominal or epigastric pain. No n/v/c/d, melena, or hematochezia.  GENITOURINARY: No dysuria, frequency or hematuria  NEUROLOGICAL:  No headache, dizziness, syncope.  MUSCULOSKELETAL:  as per HPI  HEMATOLOGIC:  No anemia, bleeding or bruising.  LYMPHATICS:  No enlarged nodes. No history of splenectomy.  PSYCHIATRIC:  No history of depression or anxiety.  ENDOCRINOLOGIC:  No reports of sweating, cold or heat intolerance. No polyuria or polydipsia.  ALLERGIES:  No history of asthma, hives, eczema or rhinitis. CONSTITUTIONAL: No weakness, fevers, or chills  EYES: No visual changes or eye pain  THROAT: No oropharyngeal exudates or throat pain   NECK: No pain or stiffness  RESPIRATORY: No SOB, cough, wheezing, or hemoptysis  CARDIOVASCULAR: No chest pain or palpitations. No LE edema.   GASTROINTESTINAL: No abdominal or epigastric pain. No n/v/c/d, melena, or hematochezia.  GENITOURINARY: No dysuria, frequency, or hematuria  NEUROLOGICAL: No headache, dizziness, or syncope  MUSCULOSKELETAL: +Muscle cramps, no muscle weakness. No joint pain or erythema.  HEMATOLOGIC: No anemia, bleeding, or bruising.  LYMPHATICS: No enlarged nodes. No history of splenectomy.  PSYCHIATRIC: No history of depression or anxiety.  ENDOCRINOLOGIC: No reports of sweating, cold or heat intolerance. No polyuria or polydipsia.

## 2018-08-31 NOTE — H&P ADULT - PROBLEM SELECTOR PLAN 1
CT negative, CK slightly elevated. Unvlear source  -Nueddi wants MRI  -will f/u SANG, ESR, CRP, UA, U tox, voltage gaited Ca channel, repeat CPK  -will send TSH and A1C to Muscle cramps now resolved. CT C-spine previously negative, CK slightly elevated. Unclear source.   -Neuro consult obtained. Recs reviewed and appreciated. Will get MRI brain and C-spine.   -will f/u SANG, ESR, CRP, UA, U tox, voltage gaited Ca channel, repeat CPK  -will send TSH and HgbA1c

## 2018-08-31 NOTE — H&P ADULT - NSHPLABSRESULTS_GEN_ALL_CORE
11.7   6.83  )-----------( 353      ( 31 Aug 2018 14:45 )             35.9       08-31    140  |  104  |  13  ----------------------------<  67<L>  4.1   |  24  |  0.93    Ca    9.3      31 Aug 2018 14:45  Mg     2.1     08-31    TPro  7.7  /  Alb  4.4  /  TBili  0.2  /  DBili  x   /  AST  29  /  ALT  17  /  AlkPhos  57  08-31        Creatine Kinase, Serum: 1096 11.7   6.83  )-----------( 353      ( 31 Aug 2018 14:45 )             35.9       08-31    140  |  104  |  13  ----------------------------<  67<L>  4.1   |  24  |  0.93    Ca    9.3      31 Aug 2018 14:45  Mg     2.1     08-31    TPro  7.7  /  Alb  4.4  /  TBili  0.2  /  DBili  x   /  AST  29  /  ALT  17  /  AlkPhos  57  08-31        Creatine Kinase, Serum: 1096    US venous duplex of LLE personally reviewed.   US Duplex Venous Lower Ext Ltd, Left (08.31.18 @ 16:32)   IMPRESSION:     No evidence of left lower extremity deep venous thrombosis.

## 2018-08-31 NOTE — CONSULT NOTE ADULT - ASSESSMENT
18 year old AA female presenting with left leg cramps. Patient has no PMH, taking only vitamin D. States she woke up this morning with cramping in the left calf. She tried to stand up and felt severe stabbing pain in the left calf. Reports that 2 weeks ago, she was at work when her left thumb contracted and she felt the same severe stabbing pain in the forearm. This lasted about 30 minutes and then the same thing happened to the right arm. CT cervical spine done at that time was unremarkable. Patient denies any recent travel, exercise, smoking, alcohol, drug use, numbness, tingling, weakness, changes in vision, back pain.  On exam, left calf tender to palpation on medial aspect. Unable to stand due to pain in left calf.    Left leg cramping/pain    Recommend:  MRI brain and C spine with and without contrast  Check SANG, ESR, CRP, UA, U tox, voltage gaited Ca channel, repeat CPK  Baclofen 10 for pain  EMG to be done as outpatient

## 2018-08-31 NOTE — H&P ADULT - HISTORY OF PRESENT ILLNESS
18F w/ no PMH presenting with left leg cramps. States she woke up this morning with cramping in the left calf. She tried to stand up and felt severe stabbing pain in the left calf. Reports that 2 weeks ago, she was at work when her left thumb contracted and she felt the same severe stabbing pain in the forearm. This lasted about 30 minutes and then the same thing happened to the right arm. CT cervical spine done at that time was unremarkable. Patient denies any recent travel, exercise, smoking, alcohol, drug use, numbness, tingling, weakness, changes in vision, back pain. She reports that during the winter time, she gets bad cramping in the hands.     In the ED:   Vitals: 99.2, 93, 123/76, 98RA  Notable Labs and Imaging: CK 1096, CRP 4.8  Given: Ketoralac, Baclofen, 1L NS 18F w/ no PMH presenting with left leg cramps. States she woke up this morning with cramping in the left calf. She tried to stand up and felt severe stabbing pain in the left calf that lasted about 30 minutes. Reports that 2 weeks ago, she was at work when her left thumb contracted and she felt the same severe stabbing pain in the left forearm. This lasted about 30 minutes and then the same thing happened to the right arm. CT cervical spine done at that time was unremarkable. Patient denies any recent travel, exercise, smoking, alcohol, drug use, numbness, tingling, weakness, changes in vision, back pain. She reports that during the winter time, she gets bad cramping in the hands.     In the ED:   Vitals: 99.2, 93, 123/76, 98RA  Notable Labs and Imaging: CK 1096, CRP 4.8  Given: Ketoralac, Baclofen, 1L NS

## 2018-08-31 NOTE — ED ADULT NURSE NOTE - ED STAT RN HANDOFF DETAILS
Report received from results waiting RN. Pt. remains at [facility] at this time. Pt. is admitted to Medicine bed 903B, report given to JOSE M Barnett. Pt. will be transported to 903B from [facility].

## 2018-09-01 DIAGNOSIS — R80.9 PROTEINURIA, UNSPECIFIED: ICD-10-CM

## 2018-09-01 LAB
ALBUMIN SERPL ELPH-MCNC: 3.6 G/DL — SIGNIFICANT CHANGE UP (ref 3.3–5)
ALP SERPL-CCNC: 50 U/L — SIGNIFICANT CHANGE UP (ref 40–120)
ALT FLD-CCNC: 16 U/L — SIGNIFICANT CHANGE UP (ref 4–33)
AST SERPL-CCNC: 22 U/L — SIGNIFICANT CHANGE UP (ref 4–32)
BASOPHILS # BLD AUTO: 0.03 K/UL — SIGNIFICANT CHANGE UP (ref 0–0.2)
BASOPHILS NFR BLD AUTO: 0.5 % — SIGNIFICANT CHANGE UP (ref 0–2)
BILIRUB SERPL-MCNC: < 0.2 MG/DL — LOW (ref 0.2–1.2)
BUN SERPL-MCNC: 12 MG/DL — SIGNIFICANT CHANGE UP (ref 7–23)
CALCIUM SERPL-MCNC: 8.4 MG/DL — SIGNIFICANT CHANGE UP (ref 8.4–10.5)
CHLORIDE SERPL-SCNC: 104 MMOL/L — SIGNIFICANT CHANGE UP (ref 98–107)
CK SERPL-CCNC: 814 U/L — HIGH (ref 25–170)
CO2 SERPL-SCNC: 21 MMOL/L — LOW (ref 22–31)
CREAT SERPL-MCNC: 0.85 MG/DL — SIGNIFICANT CHANGE UP (ref 0.5–1.3)
CRP SERPL-MCNC: 4.2 MG/L — SIGNIFICANT CHANGE UP
EOSINOPHIL # BLD AUTO: 0.21 K/UL — SIGNIFICANT CHANGE UP (ref 0–0.5)
EOSINOPHIL NFR BLD AUTO: 3.5 % — SIGNIFICANT CHANGE UP (ref 0–6)
ERYTHROCYTE [SEDIMENTATION RATE] IN BLOOD: 13 MM/HR — SIGNIFICANT CHANGE UP (ref 4–25)
GLUCOSE SERPL-MCNC: 99 MG/DL — SIGNIFICANT CHANGE UP (ref 70–99)
HBA1C BLD-MCNC: 5.6 % — SIGNIFICANT CHANGE UP (ref 4–5.6)
HCT VFR BLD CALC: 35.3 % — SIGNIFICANT CHANGE UP (ref 34.5–45)
HGB BLD-MCNC: 11.4 G/DL — LOW (ref 11.5–15.5)
IMM GRANULOCYTES # BLD AUTO: 0.01 # — SIGNIFICANT CHANGE UP
IMM GRANULOCYTES NFR BLD AUTO: 0.2 % — SIGNIFICANT CHANGE UP (ref 0–1.5)
LYMPHOCYTES # BLD AUTO: 3.81 K/UL — HIGH (ref 1–3.3)
LYMPHOCYTES # BLD AUTO: 63.7 % — HIGH (ref 13–44)
MAGNESIUM SERPL-MCNC: 1.9 MG/DL — SIGNIFICANT CHANGE UP (ref 1.6–2.6)
MCHC RBC-ENTMCNC: 28.7 PG — SIGNIFICANT CHANGE UP (ref 27–34)
MCHC RBC-ENTMCNC: 32.3 % — SIGNIFICANT CHANGE UP (ref 32–36)
MCV RBC AUTO: 88.9 FL — SIGNIFICANT CHANGE UP (ref 80–100)
MONOCYTES # BLD AUTO: 0.45 K/UL — SIGNIFICANT CHANGE UP (ref 0–0.9)
MONOCYTES NFR BLD AUTO: 7.5 % — SIGNIFICANT CHANGE UP (ref 2–14)
NEUTROPHILS # BLD AUTO: 1.47 K/UL — LOW (ref 1.8–7.4)
NEUTROPHILS NFR BLD AUTO: 24.6 % — LOW (ref 43–77)
NRBC # FLD: 0 — SIGNIFICANT CHANGE UP
PHOSPHATE SERPL-MCNC: 4.5 MG/DL — SIGNIFICANT CHANGE UP (ref 2.5–4.5)
PLATELET # BLD AUTO: 337 K/UL — SIGNIFICANT CHANGE UP (ref 150–400)
PMV BLD: 9.2 FL — SIGNIFICANT CHANGE UP (ref 7–13)
POTASSIUM SERPL-MCNC: 3.7 MMOL/L — SIGNIFICANT CHANGE UP (ref 3.5–5.3)
POTASSIUM SERPL-SCNC: 3.7 MMOL/L — SIGNIFICANT CHANGE UP (ref 3.5–5.3)
PROT SERPL-MCNC: 6.7 G/DL — SIGNIFICANT CHANGE UP (ref 6–8.3)
RBC # BLD: 3.97 M/UL — SIGNIFICANT CHANGE UP (ref 3.8–5.2)
RBC # FLD: 13.3 % — SIGNIFICANT CHANGE UP (ref 10.3–14.5)
SODIUM SERPL-SCNC: 139 MMOL/L — SIGNIFICANT CHANGE UP (ref 135–145)
TSH SERPL-MCNC: 2.93 UIU/ML — SIGNIFICANT CHANGE UP (ref 0.27–4.2)
WBC # BLD: 5.98 K/UL — SIGNIFICANT CHANGE UP (ref 3.8–10.5)
WBC # FLD AUTO: 5.98 K/UL — SIGNIFICANT CHANGE UP (ref 3.8–10.5)

## 2018-09-01 PROCEDURE — 99232 SBSQ HOSP IP/OBS MODERATE 35: CPT

## 2018-09-01 RX ADMIN — Medication 2000 UNIT(S): at 11:09

## 2018-09-01 NOTE — PROGRESS NOTE ADULT - PROBLEM SELECTOR PLAN 1
Muscle cramps now resolved. CT C-spine previously negative  -CK slightly elevated 1096 --> 814  -Neuro consult obtained.   -f/u MRI brain and C-spine.   -will f/u SANG, ESR, CRP, UA, U tox, voltage gaited Ca channel, repeat CPK  -TSH and A1c WNL Muscle cramps now resolved. CT C-spine previously negative  -CK slightly elevated 1096 --> 814  -Neuro consult obtained.   -On cervical spine MR imaging, there is diffusely hypointense T1 marrow   signal which may represent hyperplastic marrow versus a diffusely   infiltrative marrow process.  -will f/u SANG, ESR, CRP, UA, U tox, voltage gaited Ca channel, repeat CPK  -TSH and A1c WNL

## 2018-09-01 NOTE — CHART NOTE - NSCHARTNOTEFT_GEN_A_CORE
Late entry of plan d/w primary team on phone.    Neuro asked to comment on MRI C spine findings  No neurological involvement as per imaging.  Suggest Hem/Onc eval or f/u with radiology about these findings concerning for malignant process.  d/w attending.    < from: MR Cervical Spine No Cont (08.31.18 @ 21:09) >  IMPRESSION:  On brain MR imaging, there is no parenchymal signal, or enhancement   abnormality. No Chiari I malformation. No evidence for intracranial mass.   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  On cervical spine MR imaging, there is diffusely hypointense T1 marrow   signal which may represent hyperplastic marrow versus a diffusely   infiltrative marrow process.  No cord compression.  No cord signal abnormality.  < end of copied text >

## 2018-09-01 NOTE — PROGRESS NOTE ADULT - PROBLEM SELECTOR PLAN 1
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

## 2018-09-01 NOTE — PROGRESS NOTE ADULT - SUBJECTIVE AND OBJECTIVE BOX
CHIEF COMPLAINT: Left leg cramp    Interval Events:  No acute events overnight. Patient was seen and examined at bedside. Currently does not have leg cramps. patient is able to ambulate to bathroom but does report some left calf "pressure". Denies muscle weakness, numbness/tingling, headache, skin rashes, or muscle pain.     REVIEW OF SYSTEMS:  Constitutional: [x] negative [ ] fevers [ ] chills [ ] weight loss [ ] weight gain  HEENT: [x] negative [ ] dry eyes [ ] eye irritation [ ] postnasal drip [ ] nasal congestion  CV: [x] negative  [ ] chest pain [ ] orthopnea [ ] palpitations [ ] murmur  Resp: [x] negative [ ] cough [ ] shortness of breath [ ] dyspnea [ ] wheezing [ ] sputum [ ] hemoptysis  GI: [x] negative [ ] nausea [ ] vomiting [ ] diarrhea [ ] constipation [ ] abd pain [ ] dysphagia   : [x] negative [ ] dysuria [ ] nocturia [ ] hematuria [ ] increased urinary frequency  Musculoskeletal: [x] negative [ ] back pain [ ] myalgias [ ] arthralgias [ ] fracture  Skin: [x] negative [ ] rash [ ] itch  Neurological: [x] negative [ ] headache [ ] dizziness [ ] syncope [ ] weakness [ ] numbness  Psychiatric: [x] negative [ ] anxiety [ ] depression  Endocrine: [x] negative [ ] diabetes [ ] thyroid problem  Hematologic/Lymphatic: [x] negative [ ] anemia [ ] bleeding problem  Allergic/Immunologic: [ ] negative [ ] itchy eyes [ ] nasal discharge [ ] hives [ ] angioedema  [ ] All other systems negative  [ ] Unable to assess ROS because ________    OBJECTIVE:  T(C): 36.8 (01 Sep 2018 05:35), Max: 37.3 (31 Aug 2018 13:50)  T(F): 98.2 (01 Sep 2018 05:35), Max: 99.2 (31 Aug 2018 13:50)  HR: 77 (01 Sep 2018 05:35) (74 - 93)  BP: 112/66 (01 Sep 2018 05:35) (112/66 - 124/83)  RR: 17 (01 Sep 2018 05:35) (16 - 18)  SpO2: 99% (01 Sep 2018 05:35) (98% - 100%)        CAPILLARY BLOOD GLUCOSE          PHYSICAL EXAM:  GENERAL: NAD, well-developed  HEAD:  Atraumatic, Normocephalic  EYES: EOMI, PERRLA, conjunctiva and sclera clear  NECK: Supple, No lymphadenopathy   CHEST/LUNG: Clear to auscultation bilaterally; No wheeze  HEART: Regular rate and rhythm; No murmurs, rubs, or gallops  ABDOMEN: Soft, Nontender, Nondistended; Bowel sounds present  EXTREMITIES:  2+ Peripheral Pulses, No clubbing, cyanosis, or edema  PSYCH: AAOx3, appropriate mood and affect  NEUROLOGY: non-focal, CN II-XII intact, cerebellar function intact, 5/5 strength of upper and lower extremities. Pedal and brachioradialis reflexes 2+ bilaterally  SKIN: No rashes or lesions    LINES:    HOSPITAL MEDICATIONS:                    cholecalciferol 2000 Unit(s) Oral daily            LABS:                        11.4   5.98  )-----------( 337      ( 01 Sep 2018 06:00 )             35.3     Hgb Trend: 11.4<--, 11.7<--  09-    139  |  104  |  12  ----------------------------<  99  3.7   |  21<L>  |  0.85    Ca    8.4      01 Sep 2018 06:00  Phos  4.5     -  Mg     1.9         TPro  6.7  /  Alb  3.6  /  TBili  < 0.2<L>  /  DBili  x   /  AST  22  /  ALT  16  /  AlkPhos  50      Creatinine Trend: 0.85<--, 0.93<--    Urinalysis Basic - ( 31 Aug 2018 19:07 )    Color: YELLOW / Appearance: Lt TURBID / S.037 / pH: 6.0  Gluc: NEGATIVE / Ketone: MODERATE  / Bili: NEGATIVE / Urobili: NORMAL   Blood: LARGE / Protein: MODERATE / Nitrite: NEGATIVE   Leuk Esterase: TRACE / RBC: 6-10 / WBC >50   Sq Epi: MANY / Non Sq Epi: x / Bacteria: FEW            MICROBIOLOGY:     RADIOLOGY:  [ ] Reviewed and interpreted by me    EKG:

## 2018-09-01 NOTE — PROGRESS NOTE ADULT - SUBJECTIVE AND OBJECTIVE BOX
HPI:  18F w/ no PMH presenting with left leg cramps. States she woke up this morning with cramping in the left calf. She tried to stand up and felt severe stabbing pain in the left calf. Reports that 2 weeks ago, she was at work when her left thumb contracted and she felt the same severe stabbing pain in the forearm. This lasted about 30 minutes and then the same thing happened to the right arm. CT cervical spine done at that time was unremarkable. Patient denies any recent travel, exercise, smoking, alcohol, drug use, numbness, tingling, weakness, changes in vision, back pain. She reports that during the winter time, she gets bad cramping in the hands.     This morning patient is feeling well. No new complaints. No falls. No weakness. No neck or back pain.      PAST MEDICAL & SURGICAL HISTORY:  No pertinent past medical history  No significant past surgical history      REVIEW OF SYSTEMS:  As per HPI, otherwise negative for Constitutional, Eyes, Ears/Nose/Mouth/Throat, Neck, Cardiovascular, Respiratory, Gastrointestinal, Genitourinary, Skin, Endocrine, Musculoskeletal, Psychiatric, and Hematologic/Lymphatic.    MEDICATIONS  (STANDING):  cholecalciferol 2000 Unit(s) Oral daily  enoxaparin Injectable 40 milliGRAM(s) SubCutaneous every 24 hours    MEDICATIONS  (PRN):      Allergies    No Known Drug Allergies  seasonal (Rhinitis; Rhinorrhea; Eye Irritation)    Intolerances        FAMILY HISTORY:  No pertinent family history in first degree relatives      SOCIAL HISTORY:  Living Situation: Lives by self  Occupation:  and pharmacy assistant  Tobacco: No	  Alcohol: no  Drug use:  No    VITAL SIGNS:  Vital Signs Last 24 Hrs  T(C): 36.8 (01 Sep 2018 05:35), Max: 37.3 (31 Aug 2018 13:50)  T(F): 98.2 (01 Sep 2018 05:35), Max: 99.2 (31 Aug 2018 13:50)  HR: 77 (01 Sep 2018 05:35) (74 - 93)  BP: 112/66 (01 Sep 2018 05:35) (112/66 - 124/83)  BP(mean): --  RR: 17 (01 Sep 2018 05:35) (16 - 18)  SpO2: 99% (01 Sep 2018 05:35) (98% - 100%)    PHYSICAL EXAMINATION:  General: Well-developed, well nourished, in no acute distress.  Eyes: Conjunctiva and sclera clear.  Cardiovascular: Regular rate and rhythm; S1 and S2 Normal; No murmurs, gallops or rubs.  Neurologic:  - Mental Status:  Alert, awake, oriented to person, place, and time; Speech is fluent with intact naming, repetition, and comprehension; Immediate recall is 3/3 words and delayed recall is 3/3 words at 5 minutes; Able to spell WORLD backwards and perform serial 7 subtraction; Able to read and write a sentence; Able to copy a cube; Good overall fund of knowledge.  - Cranial Nerves II-XII:    II:  Visual acuity is 20/20 bilaterally; Visual fields are full to confrontation; Fundoscopic exam is normal with sharp discs; Pupils are equal, round, and reactive to light.  III, IV, VI:  Extraocular movements are intact without nystagmus.  V:  Facial sensation is intact in the V1-V3 distribution bilaterally.  VII:  Face is symmetric with normal eye closure and smile  VIII:  Hearing is intact to finger rub.  IX, X:  Uvula is midline and soft palate rises symmetrically  XI:  Head turning and shoulder shrug are intact.  XII:  Tongue protrudes in the midline.  - Motor:  Strength is 5/5 throughout.  There is no pronator drift.  Normal muscle bulk and tone throughout.  - Reflexes:  2+ and symmetric at the biceps, triceps, brachioradialis, knees, and ankles.  Plantar responses flexor.  - Sensory:  Intact to light touch, pin prick, vibration, and joint-position sense throughout.  - Coordination:  Finger-nose-finger and heel-knee-shin intact without dysmetria.  Rapid alternating hand movements intact.  - Gait:   Normal steps, base, arm swing, and turning.  Heel and toe walking are normal.  Tandem gait is normal.  Romberg testing is negative.    LABS:                        11.7   6.83  )-----------( 353      ( 31 Aug 2018 14:45 )             35.9     -    140  |  104  |  13  ----------------------------<  67<L>  4.1   |  24  |  0.93    Ca    9.3      31 Aug 2018 14:45  Mg     2.1         TPro  7.7  /  Alb  4.4  /  TBili  0.2  /  DBili  x   /  AST  29  /  ALT  17  /  AlkPhos  57  08-31      Urinalysis Basic - ( 31 Aug 2018 19:07 )    Color: YELLOW / Appearance: Lt TURBID / S.037 / pH: 6.0  Gluc: NEGATIVE / Ketone: MODERATE  / Bili: NEGATIVE / Urobili: NORMAL   Blood: LARGE / Protein: MODERATE / Nitrite: NEGATIVE   Leuk Esterase: TRACE / RBC: 6-10 / WBC >50   Sq Epi: MANY / Non Sq Epi: x / Bacteria: FEW        RADIOLOGY & ADDITIONAL STUDIES:      IMPRESSION & PLAN:

## 2018-09-01 NOTE — PROGRESS NOTE ADULT - PROBLEM SELECTOR PLAN 3
Pt with moderate amount of protein on UA.  - Will check spot urine protein to creatinine ratio Pt with moderate amount of protein, ketones >50 WBC on UA.  -possible that it is a dirty sample  -pt A1c 5.6%, unlikely to be in DKA.   -possible starvation ketosis?

## 2018-09-02 ENCOUNTER — TRANSCRIPTION ENCOUNTER (OUTPATIENT)
Age: 18
End: 2018-09-02

## 2018-09-02 VITALS
SYSTOLIC BLOOD PRESSURE: 126 MMHG | DIASTOLIC BLOOD PRESSURE: 77 MMHG | OXYGEN SATURATION: 98 % | TEMPERATURE: 98 F | RESPIRATION RATE: 18 BRPM | HEART RATE: 94 BPM

## 2018-09-02 PROCEDURE — 99239 HOSP IP/OBS DSCHRG MGMT >30: CPT

## 2018-09-02 RX ADMIN — Medication 2000 UNIT(S): at 12:03

## 2018-09-02 NOTE — DISCHARGE NOTE ADULT - ADDITIONAL INSTRUCTIONS
-Please make an appointment with an outpatient neurologist at the [facility] Neurology clinic located at:   [phone] [place] [address]    Phone number: [phone] -Please make an appointment with an outpatient neurologist at the [facility] Neurology clinic located at:   [phone] [place] [address]  Phone number: [phone]    -Please make an appointment with your primary care provider within 1-2 weeks of hospital discharge to discuss your abnormal MRI findings and to undergo further work up.

## 2018-09-02 NOTE — PROGRESS NOTE ADULT - PROBLEM SELECTOR PLAN 3
Pt with moderate amount of protein, ketones >50 WBC on UA.  -possible that it is a dirty sample  -pt A1c 5.6%, unlikely to be in DKA.   -possible starvation ketosis?

## 2018-09-02 NOTE — DISCHARGE NOTE ADULT - HOSPITAL COURSE
Admission per admitting resident:     18F w/ no PMH presenting with left leg cramps. States she woke up this morning with cramping in the left calf. She tried to stand up and felt severe stabbing pain in the left calf that lasted about 30 minutes. Reports that 2 weeks ago, she was at work when her left thumb contracted and she felt the same severe stabbing pain in the left forearm. This lasted about 30 minutes and then the same thing happened to the right arm. CT cervical spine done at that time was unremarkable. Patient denies any recent travel, exercise, smoking, alcohol, drug use, numbness, tingling, weakness, changes in vision, back pain. She reports that during the winter time, she gets bad cramping in the hands.     Hospital course:    In the ED, pt vitals were Tmax 99.2, HR  93,bp 123/76, SPO2 98RA. She was found to have a CK 1096, CRP 4.8. She was given Ketoralac, Baclofen, 1L NS. A UA was obtained which showed moderate ketones, large blood, moderate protein, and >50 WBC. These findings are likely secondary due to a dirty UA as patient is asymptomatic. Admission per admitting resident:     18F w/ no PMH presenting with left leg cramps. States she woke up this morning with cramping in the left calf. She tried to stand up and felt severe stabbing pain in the left calf that lasted about 30 minutes. Reports that 2 weeks ago, she was at work when her left thumb contracted and she felt the same severe stabbing pain in the left forearm. This lasted about 30 minutes and then the same thing happened to the right arm. CT cervical spine done at that time was unremarkable. Patient denies any recent travel, exercise, smoking, alcohol, drug use, numbness, tingling, weakness, changes in vision, back pain. She reports that during the winter time, she gets bad cramping in the hands.     Hospital course:    In the ED, pt vitals were Tmax 99.2, HR  93,bp 123/76, SPO2 98RA. She was found to have a CK 1096, CRP 4.8. She was given Ketoralac, Baclofen, 1L NS. A UA was obtained which showed moderate ketones, large blood, moderate protein, and >50 WBC. An A1c was ordered which was found to be 5.6%, so it is unlikely that the patient has ketones in her UA secondary to diabetes. It was determined that the UA findings were likely secondary due to a dirty sample as patient is asymptomatic. Patient was admitted to the Medicine team and Neurology was consulted. She received an MRI of cervical spine/head which was significant for nonspecific findings of hypointense T1 marrow signal, otherwise no cord compression or chiari malformation. Per neurology recommendation, a serum voltage gated calcium channel was sent. Throughout her hospitalization, patient did not endorse symptoms of muscle cramps or weakness. She is currently stable and ready for discharge. Admission per admitting resident:     18F w/ no PMH presenting with left leg cramps. States she woke up this morning with cramping in the left calf. She tried to stand up and felt severe stabbing pain in the left calf that lasted about 30 minutes. Reports that 2 weeks ago, she was at work when her left thumb contracted and she felt the same severe stabbing pain in the left forearm. This lasted about 30 minutes and then the same thing happened to the right arm. CT cervical spine done at that time was unremarkable. Patient denies any recent travel, exercise, smoking, alcohol, drug use, numbness, tingling, weakness, changes in vision, back pain. She reports that during the winter time, she gets bad cramping in the hands.     Hospital course:    In the ED, pt vitals were Tmax 99.2, HR  93,bp 123/76, SPO2 98RA. She was found to have a CK 1096, CRP 4.8. She was given Ketoralac, Baclofen, 1L NS. A UA was obtained which showed moderate ketones, large blood, moderate protein, and >50 WBC. An A1c was ordered which was found to be 5.6%, so it is unlikely that the patient has ketones in her UA secondary to diabetes. It was determined that the UA findings were likely secondary due to a dirty sample as patient is asymptomatic. Patient was admitted to the Medicine team and Neurology was consulted. She received an MRI of cervical spine/head which was significant for nonspecific findings of hypointense T1 marrow signal, otherwise no cord compression or chiari malformation. Pt was mildly anemic at a hgb of 11.4 that could possibly explain her MRI findings. However, currently not endorsing any symptoms. Per neurology recommendation, a serum voltage gated calcium channel was sent. Throughout her hospitalization, patient did not endorse symptoms of muscle cramps or weakness. She is currently stable and ready for discharge.

## 2018-09-02 NOTE — PROGRESS NOTE ADULT - ATTENDING COMMENTS
RLE cramping largely resolved, pt ambulating. MRI finding of isolated hypointense signal in C-spine without brain pathology is of unclear etiology, especially as this does not correlate with any current symptoms. Recommend outpatient Neurology follow-up. Time planning discharge 35 minutes.
Symptomatically improved, labs reassuring. MR C-spine with hypointense T1 signal of unclear significance. Agree with serologic workup, which can be completed as outpatient. Will arrange follow up. Time planning discharge 35 minutes.

## 2018-09-02 NOTE — PROGRESS NOTE ADULT - SUBJECTIVE AND OBJECTIVE BOX
CHIEF COMPLAINT: Left leg cramp    Interval Events:  No acute events overnight. Patient was seen and examined at bedside.     REVIEW OF SYSTEMS:  Constitutional: [x] negative [ ] fevers [ ] chills [ ] weight loss [ ] weight gain  HEENT: [x] negative [ ] dry eyes [ ] eye irritation [ ] postnasal drip [ ] nasal congestion  CV: [x] negative  [ ] chest pain [ ] orthopnea [ ] palpitations [ ] murmur  Resp: [x] negative [ ] cough [ ] shortness of breath [ ] dyspnea [ ] wheezing [ ] sputum [ ] hemoptysis  GI: [x] negative [ ] nausea [ ] vomiting [ ] diarrhea [ ] constipation [ ] abd pain [ ] dysphagia   : [x] negative [ ] dysuria [ ] nocturia [ ] hematuria [ ] increased urinary frequency  Musculoskeletal: [x] negative [ ] back pain [ ] myalgias [ ] arthralgias [ ] fracture  Skin: [ ] negative [ ] rash [ ] itch [x] acanthosis nigrans  Neurological: [x] negative [ ] headache [ ] dizziness [ ] syncope [ ] weakness [ ] numbness  Psychiatric: [x] negative [ ] anxiety [ ] depression  Endocrine: [x] negative [ ] diabetes [ ] thyroid problem  Hematologic/Lymphatic: [ ] negative [ ] anemia [ ] bleeding problem  Allergic/Immunologic: [ ] negative [ ] itchy eyes [ ] nasal discharge [ ] hives [ ] angioedema  [ ] All other systems negative  [ ] Unable to assess ROS because ________    OBJECTIVE:  T(C): 36.6 (02 Sep 2018 05:45), Max: 37 (01 Sep 2018 21:34)  T(F): 97.9 (02 Sep 2018 05:45), Max: 98.6 (01 Sep 2018 21:34)  HR: 76 (02 Sep 2018 05:45) (76 - 85)  BP: 105/67 (02 Sep 2018 05:45) (105/67 - 136/82)  RR: 16 (02 Sep 2018 05:45) (16 - 18)  SpO2: 100% (02 Sep 2018 05:45) (100% - 100%)        CAPILLARY BLOOD GLUCOSE    PHYSICAL EXAM:  GENERAL: NAD, well-developed  HEAD:  Atraumatic, Normocephalic  EYES: EOMI, PERRLA, conjunctiva and sclera clear  NECK: Supple, No JVD  CHEST/LUNG: Clear to auscultation bilaterally; No wheeze  HEART: Regular rate and rhythm; No murmurs, rubs, or gallops  ABDOMEN: Soft, Nontender, Nondistended; Bowel sounds present  EXTREMITIES:  2+ Peripheral Pulses, No clubbing, cyanosis, or edema  PSYCH: AAOx3  NEUROLOGY: non-focal  SKIN: No rashes or lesions    	  LINES:    HOSPITAL MEDICATIONS:                    cholecalciferol 2000 Unit(s) Oral daily            LABS:                        11.4   5.98  )-----------( 337      ( 01 Sep 2018 06:00 )             35.3     Hgb Trend: 11.4<--, 11.7<--  09-01    139  |  104  |  12  ----------------------------<  99  3.7   |  21<L>  |  0.85    Ca    8.4      01 Sep 2018 06:00  Phos  4.5     09-  Mg     1.9         TPro  6.7  /  Alb  3.6  /  TBili  < 0.2<L>  /  DBili  x   /  AST  22  /  ALT  16  /  AlkPhos  50  09-    Creatinine Trend: 0.85<--, 0.93<--    Urinalysis Basic - ( 31 Aug 2018 19:07 )    Color: YELLOW / Appearance: Lt TURBID / S.037 / pH: 6.0  Gluc: NEGATIVE / Ketone: MODERATE  / Bili: NEGATIVE / Urobili: NORMAL   Blood: LARGE / Protein: MODERATE / Nitrite: NEGATIVE   Leuk Esterase: TRACE / RBC: 6-10 / WBC >50   Sq Epi: MANY / Non Sq Epi: x / Bacteria: FEW            MICROBIOLOGY:     RADIOLOGY:  [ ] Reviewed and interpreted by me    EKG: CHIEF COMPLAINT: Left leg cramp    Interval Events:  No acute events overnight. Patient was seen and examined at bedside. She denies leg cramps, numbness/tingling, muscle weakness, muscle pain, CP, or SOB. She is able to ambulate comfortably.     REVIEW OF SYSTEMS:  Constitutional: [x] negative [ ] fevers [ ] chills [ ] weight loss [ ] weight gain  HEENT: [x] negative [ ] dry eyes [ ] eye irritation [ ] postnasal drip [ ] nasal congestion  CV: [x] negative  [ ] chest pain [ ] orthopnea [ ] palpitations [ ] murmur  Resp: [x] negative [ ] cough [ ] shortness of breath [ ] dyspnea [ ] wheezing [ ] sputum [ ] hemoptysis  GI: [x] negative [ ] nausea [ ] vomiting [ ] diarrhea [ ] constipation [ ] abd pain [ ] dysphagia   : [x] negative [ ] dysuria [ ] nocturia [ ] hematuria [ ] increased urinary frequency  Musculoskeletal: [x] negative [ ] back pain [ ] myalgias [ ] arthralgias [ ] fracture  Skin: [ ] negative [ ] rash [ ] itch [x] acanthosis nigrans  Neurological: [x] negative [ ] headache [ ] dizziness [ ] syncope [ ] weakness [ ] numbness  Psychiatric: [x] negative [ ] anxiety [ ] depression  Endocrine: [x] negative [ ] diabetes [ ] thyroid problem  Hematologic/Lymphatic: [ ] negative [ ] anemia [ ] bleeding problem  Allergic/Immunologic: [ ] negative [ ] itchy eyes [ ] nasal discharge [ ] hives [ ] angioedema  [ ] All other systems negative  [ ] Unable to assess ROS because ________    OBJECTIVE:  T(C): 36.6 (02 Sep 2018 05:45), Max: 37 (01 Sep 2018 21:34)  T(F): 97.9 (02 Sep 2018 05:45), Max: 98.6 (01 Sep 2018 21:34)  HR: 76 (02 Sep 2018 05:45) (76 - 85)  BP: 105/67 (02 Sep 2018 05:45) (105/67 - 136/82)  RR: 16 (02 Sep 2018 05:45) (16 - 18)  SpO2: 100% (02 Sep 2018 05:45) (100% - 100%)        CAPILLARY BLOOD GLUCOSE    PHYSICAL EXAM:  GENERAL: NAD, well-developed  HEAD:  Atraumatic, Normocephalic  EYES: EOMI, PERRLA, conjunctiva and sclera clear  NECK: Supple, acanthosis nigrans over neck  CHEST/LUNG: Clear to auscultation bilaterally; No wheeze  HEART: S1 and S2. Regular rate and rhythm; No murmurs, rubs, or gallops  ABDOMEN: Soft, Nontender, Nondistended; Bowel sounds present  EXTREMITIES:  2+ Peripheral Pulses, No clubbing, cyanosis, or edema  PSYCH: AAOx3, appropriate mood and affect  NEUROLOGY: non-focal, CN grossly intact. Strength 5/5 of upper and lower extremities  SKIN: acanthosis over neck   	  LINES:    HOSPITAL MEDICATIONS:                    cholecalciferol 2000 Unit(s) Oral daily            LABS:                        11.4   5.98  )-----------( 337      ( 01 Sep 2018 06:00 )             35.3     Hgb Trend: 11.4<--, 11.7<--  09-01    139  |  104  |  12  ----------------------------<  99  3.7   |  21<L>  |  0.85    Ca    8.4      01 Sep 2018 06:00  Phos  4.5     09-  Mg     1.9     09-    TPro  6.7  /  Alb  3.6  /  TBili  < 0.2<L>  /  DBili  x   /  AST  22  /  ALT  16  /  AlkPhos  50  09-01    Creatinine Trend: 0.85<--, 0.93<--    Urinalysis Basic - ( 31 Aug 2018 19:07 )    Color: YELLOW / Appearance: Lt TURBID / S.037 / pH: 6.0  Gluc: NEGATIVE / Ketone: MODERATE  / Bili: NEGATIVE / Urobili: NORMAL   Blood: LARGE / Protein: MODERATE / Nitrite: NEGATIVE   Leuk Esterase: TRACE / RBC: 6-10 / WBC >50   Sq Epi: MANY / Non Sq Epi: x / Bacteria: FEW            MICROBIOLOGY:     RADIOLOGY:  [ ] Reviewed and interpreted by me    EKG:

## 2018-09-02 NOTE — DISCHARGE NOTE ADULT - MEDICATION SUMMARY - MEDICATIONS TO TAKE
I will START or STAY ON the medications listed below when I get home from the hospital:    Vitamin D2 2000 intl units oral capsule  -- 1 cap(s) by mouth once a day  -- Indication: For Health maintenance

## 2018-09-02 NOTE — DISCHARGE NOTE ADULT - PLAN OF CARE
Ongoing management You were admitted for further work up for a left leg cramp. Neurology was consulted who recommended we get an MRI of your cervical spine and head, given your history of pain in your arms and neck about two weeks ago. The MRI found no compression of your nerves that could explain your symptoms. Please follow up and establish care with neurology as an outpatient at the [facility] within 1-2 weeks of hospital discharge. They are located at [address] [place] [address] and their phone number is [phone]. If you have symptoms of severe muscle pain, muscle weakness, or numbness/tingling, please notify your physician and return to the hospital. At admission, a urinalysis of your urine showed a moderate amount of proteins. A hemoglobin A1c was ordered to determine if you had diabetes, which can explain proteins in the urine. But your A1c was 5.6% which indicates that you do not have diabetes. It was determined that the urinalysis was contaminated and you were monitored for symptoms. You remained asymptomatic this hospitalization. Please follow up with your outpatient primary care provider. At admission, an MRI of your cervical spine and head was ordered given your history of pain in your arms and neck two weeks ago. Although there was no cord compression found that could explain your symptoms, there was an abnormal finding. Your spine showed a diffuse hypointense T1 marrow signal which could represent a hyperplastic marrow or a diffusely infiltrative process. Please discuss these findings with your primary care provider within 1-2 weeks of hospital discharge to undergo further outpatient work up. At admission, an MRI of your cervical spine and head was ordered given your history of pain in your arms and neck two weeks ago. Although there was no cord compression found that could explain your symptoms, there was an abnormal finding. Your spine showed a diffuse hypointense T1 marrow signal which could represent a hyperplastic marrow or a diffusely infiltrative process. Please discuss these findings with your primary care provider, Dr. German, within 1-2 weeks of hospital discharge to undergo further outpatient work up.

## 2018-09-02 NOTE — PROGRESS NOTE ADULT - PROBLEM SELECTOR PLAN 2
Darkening of skin over posterior neck, concerning for insulin resistance and/or metabolic syndrome  -A1c 5.6%
Darkening of skin over posterior neck, concerning for insulin resistance and/or metabolic syndrome  -A1c 5.6%

## 2018-09-02 NOTE — DISCHARGE NOTE ADULT - PATIENT PORTAL LINK FT
You can access the [facility] Patient Portal, offered by [facility], by registering with the following website: [email or web address]

## 2018-09-02 NOTE — PROGRESS NOTE ADULT - PROBLEM SELECTOR PLAN 4
- VTE ppx: No pharmacologic ppx, as pt is low risk with IMPROVE score 0. Encourage OOB and ambulation.  IMPROVE VTE Individual Risk Assessment  RISK                                                                Points  [  ] Previous VTE                                                  3  [  ] Thrombophilia                                               2  [  ] Lower limb paralysis                                      2        (unable to hold up >15 seconds)    [  ] Current Cancer                                              2         (within 6 months)  [  ] Immobilization > 24 hrs                                1  [  ] ICU/CCU stay > 24 hours                              1  [  ] Age > 60                                                      1  IMPROVE VTE Score ____0____    - Diet: regular
- VTE ppx: No pharmacologic ppx, as pt is low risk with IMPROVE score 0. Encourage OOB and ambulation.  IMPROVE VTE Individual Risk Assessment  RISK                                                                Points  [  ] Previous VTE                                                  3  [  ] Thrombophilia                                               2  [  ] Lower limb paralysis                                      2        (unable to hold up >15 seconds)    [  ] Current Cancer                                              2         (within 6 months)  [  ] Immobilization > 24 hrs                                1  [  ] ICU/CCU stay > 24 hours                              1  [  ] Age > 60                                                      1  IMPROVE VTE Score ____0____    - Diet: regular

## 2018-09-02 NOTE — DISCHARGE NOTE ADULT - OTHER SIGNIFICANT FINDINGS
< from: MR Head w/wo IV Cont (08.31.18 @ 21:09) >  IMPRESSION:  On brain MR imaging, there is no parenchymal signal, or enhancement   abnormality. No Chiari I malformation. No evidence for intracranial mass.   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  On cervical spine MR imaging, there is diffusely hypointense T1 marrow   signal which may represent hyperplastic marrow versus a diffusely   infiltrative marrow process.  No cord compression.  No cord signal abnormality.    < end of copied text >

## 2018-09-02 NOTE — PROGRESS NOTE ADULT - PROBLEM SELECTOR PLAN 1
Muscle cramps now resolved. CT C-spine previously negative  -CK slightly elevated 1096 --> 814  -Neuro consult obtained.   -On cervical spine MR imaging, there is diffusely hypointense T1 marrow   signal which may represent hyperplastic marrow versus a diffusely   infiltrative marrow process.  -Per neuro, patient can f/u with neurology as an outpt  -TSH and A1c WNL Muscle cramps now resolved. CT C-spine previously negative  -CK slightly elevated 1096 --> 814  -Neuro consult obtained.   -On cervical spine MR imaging, there is diffusely hypointense T1 marrow   signal which may represent hyperplastic marrow versus a diffusely   infiltrative marrow process. Patient is mildly anemic at hemoglobin of 11.4 that could possible explain this finding.   -Pt currently not endorsing any symptoms   -Per neuro, patient can f/u with neurology as an outpt  -TSH and A1c WNL

## 2018-09-02 NOTE — PROGRESS NOTE ADULT - ASSESSMENT
18F w/ no PMH presenting with sharp left leg cramps of unknown etiology .
18F w/ no PMH presenting with sharp left leg cramps of unknown etiology .
Neurology resident note exam and assesment and plan reviewed  Patient with episode of cramping at multiple limbs

## 2018-09-02 NOTE — DISCHARGE NOTE ADULT - CARE PLAN
Principal Discharge DX:	Muscle cramp Principal Discharge DX:	Muscle cramp  Goal:	Ongoing management  Assessment and plan of treatment:	You were admitted for further work up for a left leg cramp. Neurology was consulted who recommended we get an MRI of your cervical spine and head, given your history of pain in your arms and neck about two weeks ago. The MRI found no compression of your nerves that could explain your symptoms. Please follow up and establish care with neurology as an outpatient at the [facility] within 1-2 weeks of hospital discharge. They are located at [address] [place] [address] and their phone number is [phone]. If you have symptoms of severe muscle pain, muscle weakness, or numbness/tingling, please notify your physician and return to the hospital.  Secondary Diagnosis:	Proteinuria  Goal:	Ongoing management  Assessment and plan of treatment:	At admission, a urinalysis of your urine showed a moderate amount of proteins. A hemoglobin A1c was ordered to determine if you had diabetes, which can explain proteins in the urine. But your A1c was 5.6% which indicates that you do not have diabetes. It was determined that the urinalysis was contaminated and you were monitored for symptoms. You remained asymptomatic this hospitalization. Please follow up with your outpatient primary care provider. Principal Discharge DX:	Muscle cramp  Goal:	Ongoing management  Assessment and plan of treatment:	You were admitted for further work up for a left leg cramp. Neurology was consulted who recommended we get an MRI of your cervical spine and head, given your history of pain in your arms and neck about two weeks ago. The MRI found no compression of your nerves that could explain your symptoms. Please follow up and establish care with neurology as an outpatient at the [facility] within 1-2 weeks of hospital discharge. They are located at [address] [place] [address] and their phone number is [phone]. If you have symptoms of severe muscle pain, muscle weakness, or numbness/tingling, please notify your physician and return to the hospital.  Secondary Diagnosis:	Proteinuria  Goal:	Ongoing management  Assessment and plan of treatment:	At admission, a urinalysis of your urine showed a moderate amount of proteins. A hemoglobin A1c was ordered to determine if you had diabetes, which can explain proteins in the urine. But your A1c was 5.6% which indicates that you do not have diabetes. It was determined that the urinalysis was contaminated and you were monitored for symptoms. You remained asymptomatic this hospitalization. Please follow up with your outpatient primary care provider.  Secondary Diagnosis:	Abnormal MRI  Goal:	Ongoing management  Assessment and plan of treatment:	At admission, an MRI of your cervical spine and head was ordered given your history of pain in your arms and neck two weeks ago. Although there was no cord compression found that could explain your symptoms, there was an abnormal finding. Your spine showed a diffuse hypointense T1 marrow signal which could represent a hyperplastic marrow or a diffusely infiltrative process. Please discuss these findings with your primary care provider within 1-2 weeks of hospital discharge to undergo further outpatient work up. Principal Discharge DX:	Muscle cramp  Goal:	Ongoing management  Assessment and plan of treatment:	You were admitted for further work up for a left leg cramp. Neurology was consulted who recommended we get an MRI of your cervical spine and head, given your history of pain in your arms and neck about two weeks ago. The MRI found no compression of your nerves that could explain your symptoms. Please follow up and establish care with neurology as an outpatient at the [facility] within 1-2 weeks of hospital discharge. They are located at [address] [place] [address] and their phone number is [phone]. If you have symptoms of severe muscle pain, muscle weakness, or numbness/tingling, please notify your physician and return to the hospital.  Secondary Diagnosis:	Proteinuria  Goal:	Ongoing management  Assessment and plan of treatment:	At admission, a urinalysis of your urine showed a moderate amount of proteins. A hemoglobin A1c was ordered to determine if you had diabetes, which can explain proteins in the urine. But your A1c was 5.6% which indicates that you do not have diabetes. It was determined that the urinalysis was contaminated and you were monitored for symptoms. You remained asymptomatic this hospitalization. Please follow up with your outpatient primary care provider.  Secondary Diagnosis:	Abnormal MRI  Goal:	Ongoing management  Assessment and plan of treatment:	At admission, an MRI of your cervical spine and head was ordered given your history of pain in your arms and neck two weeks ago. Although there was no cord compression found that could explain your symptoms, there was an abnormal finding. Your spine showed a diffuse hypointense T1 marrow signal which could represent a hyperplastic marrow or a diffusely infiltrative process. Please discuss these findings with your primary care provider, Dr. German, within 1-2 weeks of hospital discharge to undergo further outpatient work up.

## 2018-09-02 NOTE — DISCHARGE NOTE ADULT - CARE PROVIDER_API CALL
Raman German), Internal Medicine  [address] [place] [address]  Phone: [phone]  Fax: [phone]

## 2018-09-04 LAB — ANA TITR SER: NEGATIVE — SIGNIFICANT CHANGE UP

## 2018-09-05 LAB — ANA TITR SER: NEGATIVE — SIGNIFICANT CHANGE UP

## 2018-09-10 LAB — MISCELLANEOUS - CHEM: SIGNIFICANT CHANGE UP

## 2019-09-05 ENCOUNTER — EMERGENCY (EMERGENCY)
Facility: HOSPITAL | Age: 19
LOS: 1 days | Discharge: HOME | End: 2019-09-05
Attending: EMERGENCY MEDICINE | Admitting: EMERGENCY MEDICINE
Payer: COMMERCIAL

## 2019-09-05 VITALS
TEMPERATURE: 98 F | RESPIRATION RATE: 16 BRPM | OXYGEN SATURATION: 100 % | HEART RATE: 96 BPM | SYSTOLIC BLOOD PRESSURE: 133 MMHG | DIASTOLIC BLOOD PRESSURE: 79 MMHG

## 2019-09-05 PROCEDURE — 99284 EMERGENCY DEPT VISIT MOD MDM: CPT

## 2019-09-06 VITALS
SYSTOLIC BLOOD PRESSURE: 113 MMHG | TEMPERATURE: 99 F | HEART RATE: 93 BPM | OXYGEN SATURATION: 100 % | DIASTOLIC BLOOD PRESSURE: 68 MMHG | RESPIRATION RATE: 16 BRPM

## 2019-09-06 LAB
ALBUMIN SERPL ELPH-MCNC: 4.4 G/DL — SIGNIFICANT CHANGE UP (ref 3.3–5)
ALP SERPL-CCNC: 68 U/L — SIGNIFICANT CHANGE UP (ref 40–120)
ALT FLD-CCNC: 19 U/L — SIGNIFICANT CHANGE UP (ref 4–33)
ANION GAP SERPL CALC-SCNC: 12 MMO/L — SIGNIFICANT CHANGE UP (ref 7–14)
AST SERPL-CCNC: 20 U/L — SIGNIFICANT CHANGE UP (ref 4–32)
BASOPHILS # BLD AUTO: 0.03 K/UL — SIGNIFICANT CHANGE UP (ref 0–0.2)
BASOPHILS NFR BLD AUTO: 0.3 % — SIGNIFICANT CHANGE UP (ref 0–2)
BILIRUB SERPL-MCNC: 0.3 MG/DL — SIGNIFICANT CHANGE UP (ref 0.2–1.2)
BUN SERPL-MCNC: 7 MG/DL — SIGNIFICANT CHANGE UP (ref 7–23)
CALCIUM SERPL-MCNC: 9.2 MG/DL — SIGNIFICANT CHANGE UP (ref 8.4–10.5)
CHLORIDE SERPL-SCNC: 101 MMOL/L — SIGNIFICANT CHANGE UP (ref 98–107)
CO2 SERPL-SCNC: 26 MMOL/L — SIGNIFICANT CHANGE UP (ref 22–31)
CREAT SERPL-MCNC: 0.89 MG/DL — SIGNIFICANT CHANGE UP (ref 0.5–1.3)
EOSINOPHIL # BLD AUTO: 0.09 K/UL — SIGNIFICANT CHANGE UP (ref 0–0.5)
EOSINOPHIL NFR BLD AUTO: 1 % — SIGNIFICANT CHANGE UP (ref 0–6)
GLUCOSE SERPL-MCNC: 100 MG/DL — HIGH (ref 70–99)
HCT VFR BLD CALC: 39.9 % — SIGNIFICANT CHANGE UP (ref 34.5–45)
HGB BLD-MCNC: 12.6 G/DL — SIGNIFICANT CHANGE UP (ref 11.5–15.5)
IMM GRANULOCYTES NFR BLD AUTO: 0.2 % — SIGNIFICANT CHANGE UP (ref 0–1.5)
LYMPHOCYTES # BLD AUTO: 3.13 K/UL — SIGNIFICANT CHANGE UP (ref 1–3.3)
LYMPHOCYTES # BLD AUTO: 36.3 % — SIGNIFICANT CHANGE UP (ref 13–44)
MCHC RBC-ENTMCNC: 28.7 PG — SIGNIFICANT CHANGE UP (ref 27–34)
MCHC RBC-ENTMCNC: 31.6 % — LOW (ref 32–36)
MCV RBC AUTO: 90.9 FL — SIGNIFICANT CHANGE UP (ref 80–100)
MONOCYTES # BLD AUTO: 0.56 K/UL — SIGNIFICANT CHANGE UP (ref 0–0.9)
MONOCYTES NFR BLD AUTO: 6.5 % — SIGNIFICANT CHANGE UP (ref 2–14)
NEUTROPHILS # BLD AUTO: 4.8 K/UL — SIGNIFICANT CHANGE UP (ref 1.8–7.4)
NEUTROPHILS NFR BLD AUTO: 55.7 % — SIGNIFICANT CHANGE UP (ref 43–77)
NRBC # FLD: 0 K/UL — SIGNIFICANT CHANGE UP (ref 0–0)
PLATELET # BLD AUTO: 364 K/UL — SIGNIFICANT CHANGE UP (ref 150–400)
PMV BLD: 9.2 FL — SIGNIFICANT CHANGE UP (ref 7–13)
POTASSIUM SERPL-MCNC: 4 MMOL/L — SIGNIFICANT CHANGE UP (ref 3.5–5.3)
POTASSIUM SERPL-SCNC: 4 MMOL/L — SIGNIFICANT CHANGE UP (ref 3.5–5.3)
PROT SERPL-MCNC: 7.7 G/DL — SIGNIFICANT CHANGE UP (ref 6–8.3)
RBC # BLD: 4.39 M/UL — SIGNIFICANT CHANGE UP (ref 3.8–5.2)
RBC # FLD: 12.6 % — SIGNIFICANT CHANGE UP (ref 10.3–14.5)
SODIUM SERPL-SCNC: 139 MMOL/L — SIGNIFICANT CHANGE UP (ref 135–145)
WBC # BLD: 8.63 K/UL — SIGNIFICANT CHANGE UP (ref 3.8–10.5)
WBC # FLD AUTO: 8.63 K/UL — SIGNIFICANT CHANGE UP (ref 3.8–10.5)

## 2019-09-06 PROCEDURE — 70491 CT SOFT TISSUE NECK W/DYE: CPT | Mod: 26

## 2019-09-06 RX ORDER — PENICILLIN G BENZATHINE 1200000 [IU]/2ML
1.2 INJECTION, SUSPENSION INTRAMUSCULAR ONCE
Refills: 0 | Status: COMPLETED | OUTPATIENT
Start: 2019-09-06 | End: 2019-09-06

## 2019-09-06 RX ORDER — SODIUM CHLORIDE 9 MG/ML
1000 INJECTION INTRAMUSCULAR; INTRAVENOUS; SUBCUTANEOUS ONCE
Refills: 0 | Status: COMPLETED | OUTPATIENT
Start: 2019-09-06 | End: 2019-09-06

## 2019-09-06 RX ORDER — KETOROLAC TROMETHAMINE 30 MG/ML
15 SYRINGE (ML) INJECTION ONCE
Refills: 0 | Status: DISCONTINUED | OUTPATIENT
Start: 2019-09-06 | End: 2019-09-06

## 2019-09-06 RX ADMIN — Medication 15 MILLIGRAM(S): at 03:21

## 2019-09-06 RX ADMIN — PENICILLIN G BENZATHINE 1.2 MILLION UNIT(S): 1200000 INJECTION, SUSPENSION INTRAMUSCULAR at 03:21

## 2019-09-06 RX ADMIN — SODIUM CHLORIDE 1000 MILLILITER(S): 9 INJECTION INTRAMUSCULAR; INTRAVENOUS; SUBCUTANEOUS at 03:21

## 2019-09-06 NOTE — ED ADULT NURSE NOTE - OBJECTIVE STATEMENT
received pt to intake aox4 in no apparent distress VSS. Pt c/o throat soreness x 4 days with non productive cough. Pt noted to be whispering states too painful to speak and regular volume. Pt endorses painful swallowing but no difficulty swallowing or breathing. Pt endorses chills. Breaths equal and unlabored VSS not febrile no swelling or drooling noted. Breaths equal and unlabored 20 G IV L AC placed labs sent medicated as per order will continue to monitor

## 2019-09-06 NOTE — ED PROVIDER NOTE - NSFOLLOWUPCLINICS_GEN_ALL_ED_FT
[facility] - ENT  Otolaryngology (ENT)  [address] [place] [address]  Phone: [phone]  Fax:   Follow Up Time: 4-6 Days

## 2019-09-06 NOTE — ED PROVIDER NOTE - PATIENT PORTAL LINK FT
You can access the FollowMyHealth Patient Portal offered by [facility] by registering at the following website: [email or web address]. By joining [employer]’s FollowMyHealth portal, you will also be able to view your health information using other applications (apps) compatible with our system.

## 2019-09-06 NOTE — ED PROVIDER NOTE - PHYSICAL EXAMINATION
Gen: Alert, mild distress, hoarse voice  Head: NC, AT,  EOMI, normal lids/conjunctiva  ENT:  normal hearing, patent oropharynx with +erythema and exudates near tonsillar pillars, enlarged tonsils  Neck: +supple, +ttp R anterolateral neck; no meningismus/JVD, +Trachea midline  Chest: no chest wall tenderness, equal chest rise  Pulm: Bilateral BS, normal resp effort, no wheeze/stridor/retractions  CV: tachy, no M/R/G, +dist pulses  Abd: +BS, soft, NT/ND  Rectal: deferred  Mskel: no edema/erythema/cyanosis  Skin: no rash  Neuro: AAOx3

## 2019-09-06 NOTE — ED PROVIDER NOTE - CLINICAL SUMMARY MEDICAL DECISION MAKING FREE TEXT BOX
18yo F here for sore throat X 4 days, bacterial strep via clinical dx - will treat w/ IM Pen G, but also needs CT soft tissue neck to r/o abscess. Labs, meds, imaging, reassess.

## 2019-09-06 NOTE — ED PROVIDER NOTE - OBJECTIVE STATEMENT
Pertinent PMH/PSH/FHx/SHx and Review of Systems contained within:  20yo F, denies significant pmh, c/o sore throat, non-productive cough, dysphagia/odynophagia X 4 days, gradually worsening, now w/ hoarse voice, fever (reports tmax of 101 orally) and chills. No recent travel or known sick contacts.  No photophobia/eye pain/changes in vision, No ear pain. No chest pain/palpitations, no SOB/cough/wheeze/stridor, No abdominal pain, No N/V/D, no dysuria/frequency/discharge, No back pain, no rash, no new focal neuro symptoms.

## 2019-09-06 NOTE — ED PROVIDER NOTE - NSFOLLOWUPINSTRUCTIONS_ED_ALL_ED_FT
You were seen in our emergency department for throat pain.    We diagnosed you with bacterial pharyngitis (bacterial infection of your throat also known as strep throat) and treated you with an intramuscular injection of antibiotics.     The CT scan of your neck also shows that you have very large tonsils and possibly tonsillitis. Please call the number provided to schedule an appointment with the Ear Nose and Throat Doctor (also known as Otolaryngologist) for further management.    Take ibuprofen (600mg every 8 hours  as needed for pain). If this is not enough, take tylenol (650mg every 8 hours) in addition to the ibuprofen.     Return to the emergency department if your symptoms worsen or if you develop any other symptoms that are concerning to you.

## 2020-02-03 NOTE — PATIENT PROFILE ADULT. - NSNUTRITIONRISK_GI_A_CORE
Discharge Summary - [facility] 35 y o  male MRN: [id number]  Unit/Bed#: Demario Townsend 383-02 Encounter: [id number]     Admission Date: 1/23/2020         Discharge Date: 2/3/2020    Attending Psychiatrist: George Alexandre MD    Reason for Admission/HPI:     History of Present Illness     Copied and pasted as per Dr Aurora Zambrano H&P  Oxana Holt is a 35 y o  male with a history of Bipolar Disorder who was admitted to the inpatient psychiatric unit on a voluntary 201 commitment basis due to depression, anxiety, unstable mood and inability to care for self because of mental illness      Symptoms prior to admission included worsening depression, depression, feeling depressed, suicidal ideation, hopelessness, helplessness, poor concentration, poor appetite and difficulty sleeping  Onset of symptoms was gradual starting several weeks ago with progressively worsening course since that time  Stressors preceding admission included chronic pain, housing issues, limited support, social difficulties and chronic mental illness       On initial evaluation after admission to the inpatient psychiatric unit the patient Visibly depressed with practically flat affect, he endorsed suicidal thoughts and feelings of hopelessness and helplessness but he contracted for safety in the unit, he stated that he wants to feel better  The patient described that the last time he felt good was in November of last year, about 2 months ago after his discharge from [facility]   The patient stated that he could not afford co-pay of his medications that he patient believed he was helping him and his mental state slowly declined  He became more and more depressed, sad, loss of energy and hope, and suicidal thoughts came to his mind    The patient had history of at least 4 suicidal attempts by overdose in the past   His psychiatric condition complicated by severe medical conditions including a poorly controlled diabetes with peripheral vascular disorder and recent amputation of a right toe  This time, the patient was transferred from medical floor  The patient was initially admitted to psychiatric unit but was medically sick and transferred to medical floor for IV antibiotics  The writer evaluated the patient when he was on medical floor, patient has continued to express suicidal thoughts and was visibly sad depressed with flat affect  Decision was make to readmit the patient to psychiatric unit for continuation of his care         the patient had previous psychiatric hospitalization to [facility]  The the available record indicated that he had similar presentation in the past, but can't improved as a result of medication management in safe environment of psychiatric unit  Historical Information     Past Psychiatric History:     Past Inpatient Psychiatric Treatment:  Several past inpatient psychiatric admissions  Past Outpatient Psychiatric Treatment: Was in outpatient psychiatric treatment in the past with a psychiatrist  Past Suicide attempts: yes, overdose on medications  Past Violent behavior: no  Past Psychiatric medication trials: multiple medication trials    Substance Abuse History:    Social History     Tobacco History     Smoking Status  Current Every Day Smoker Smoking Frequency  1 5 packs/day for 22 years (33 pk yrs) Smoking Tobacco Type  Cigarettes    Smokeless Tobacco Use  Current User Smokeless Tobacco Type  Chew          Alcohol History     Alcohol Use Status  Yes          Drug Use     Drug Use Status  Not Currently Types  Marijuana Comment  monthly          Sexual Activity     Sexually Active  Not Currently Partners  Female          Activities of Daily Living    Not Asked                 Alcohol use: occasional, social use when out with friends    Recreational drug use: denies      Cocaine:  Denies use   Heroin:  Denies use   Marijuana:  Denies current use   Hx of using 3 or more times/week   Other drugs:  Denies use   Longest clean time: n/a    History of Inpatient/Outpatient rehabilitation program: no  Smoking history: denies current use  Use of Caffeine: coffee 2-3 cups /day    Family Psychiatric History:     Psychiatric Illness: Mother - depression  Substance Abuse: no  Suicide Attempt: no    Social History:    Education: high school diploma/GED  Learning Disabilities: none  Marital History: single  Living arrangement, social support: The patient lives alone, currently homeless  Occupational History: unemployed  Functioning Relationships: poor support system    Legal History: none   History: None        Traumatic History:     Abuse: not willing to provide details  Other Traumatic Events:none     Past Medical History:    History of seizures: no  History of head injury with loss of consciousness: no    Past Medical History:   Diagnosis Date    Acute kidney injury (NAIF) with acute tubular necrosis (ATN) ([place] [address] ) 1/19/2020    Anxiety     Bipolar 1 disorder ([facility])     Chronic pain disorder     Depression     Diabetes mellitus ([place] [address] )     History of electroconvulsive therapy 1/30/2020    Hypertension     Psychiatric illness     PTSD (post-traumatic stress disorder)     Sleep difficulties     Substance abuse ([place] [address] )     Suicide attempt ([place] [address] )     Type 2 diabetes mellitus with diabetic polyneuropathy, with long-term current use of insulin ([facility])        Meds/Allergies     all current active meds have been reviewed and current meds:   Current Facility-Administered Medications   Medication Dose Route Frequency    acetaminophen (TYLENOL) tablet 650 mg  650 mg Oral Q6H PRN    acetaminophen (TYLENOL) tablet 650 mg  650 mg Oral Q4H PRN    acetaminophen (TYLENOL) tablet 975 mg  975 mg Oral Q6H PRN    aluminum-magnesium hydroxide-simethicone (MYLANTA) 200-200-20 mg/5 mL oral suspension 30 mL  30 mL Oral Q4H PRN    aspirin (ECOTRIN LOW STRENGTH) EC tablet 81 mg  81 mg Oral Daily    atorvastatin (LIPITOR) tablet 40 mg  40 mg Oral Daily With Dinner    benztropine (COGENTIN) injection 1 mg  1 mg Intramuscular Q6H PRN    benztropine (COGENTIN) tablet 1 mg  1 mg Oral Q6H PRN    benztropine (COGENTIN) tablet 1 mg  1 mg Oral BID    busPIRone (BUSPAR) tablet 10 mg  10 mg Oral BID    cariprazine (VRAYLAR) capsule 3 mg  3 mg Oral Daily    chlorproMAZINE (THORAZINE) tablet 50 mg  50 mg Oral TID    collagenase (SANTYL) ointment   Topical Daily    gabapentin (NEURONTIN) capsule 300 mg  300 mg Oral TID    glimepiride (AMARYL) tablet 1 mg  1 mg Oral Daily With Breakfast    haloperidol (HALDOL) tablet 5 mg  5 mg Oral Q8H PRN    haloperidol lactate (HALDOL) injection 5 mg  5 mg Intramuscular Q6H PRN    hydrOXYzine HCL (ATARAX) tablet 25 mg  25 mg Oral Q6H PRN    insulin glargine (LANTUS) subcutaneous injection 42 Units 0 42 mL  42 Units Subcutaneous QAM    insulin lispro (HumaLOG) 100 units/mL subcutaneous injection 2-12 Units  2-12 Units Subcutaneous HS    insulin lispro (HumaLOG) 100 units/mL subcutaneous injection 2-12 Units  2-12 Units Subcutaneous TID AC    lisinopril (ZESTRIL) tablet 15 mg  15 mg Oral Daily    LORazepam (ATIVAN) 2 mg/mL injection 1 mg  1 mg Intramuscular Q6H PRN    LORazepam (ATIVAN) tablet 0 5 mg  0 5 mg Oral Q8H PRN    magnesium hydroxide (MILK OF MAGNESIA) 400 mg/5 mL oral suspension 30 mL  30 mL Oral Daily PRN    metFORMIN (GLUCOPHAGE) tablet 1,000 mg  1,000 mg Oral BID With Meals    metoprolol tartrate (LOPRESSOR) tablet 50 mg  50 mg Oral Q12H RUSH    nicotine polacrilex (NICORETTE) gum 2 mg  2 mg Oral Q2H PRN    OLANZapine (ZyPREXA) IM injection 10 mg  10 mg Intramuscular Q3H PRN    OLANZapine (ZyPREXA) tablet 10 mg  10 mg Oral Q3H PRN    risperiDONE (RisperDAL M-TABS) dispersible tablet 1 mg  1 mg Oral Q3H PRN    saccharomyces boulardii (FLORASTOR) capsule 250 mg  250 mg Oral BID    sodium bicarbonate tablet 650 mg  650 mg Oral Daily    traZODone (DESYREL) tablet 50 mg  50 mg Oral HS PRN    traZODone (DESYREL) tablet 50 mg  50 mg Oral HS       Allergies   Allergen Reactions    Penicillins Rash and Other (See Comments)     rash (Tolerating Ancef-per RN)  Last noted when patient was a child       Objective     Vital signs in last 24 hours:  Temp:  [98 1 °F (36 7 °C)-98 3 °F (36 8 °C)] 98 1 °F (36 7 °C)  HR:  [73-83] 73  Resp:  [16-18] 18  BP: (119-140)/(73-82) 140/82    No intake or output data in the 24 hours ending 02/03/20 [address] Course: The patient was admitted to the inpatient psychiatric unit and started on every 15 minutes precautions  During the hospitalization the patient was attending individual therapy, group therapy, milieu therapy and occupational therapy  Psychiatric medications were titrated over the hospital stay  To address depression, mood instability, insomnia, attention and concentration difficulties, insomnia, helplessness, hopelessness and suicidal ideation the patient was started on mood stabilizer Neurontin 300 mg/TID; antipsychotic medication Thorazine 50 mg/TID and Vraylar 3 mg/daily, anxiolytic medication Buspar 50 mg/BID and hypnotic medication Trazodone 50 mg at bedtime  Medication doses were titrated during the hospital course  Prior to beginning of treatment medications risks and benefits and possible side effects including risk of parkinsonian symptoms, Tardive Dyskinesia and metabolic syndrome related to treatment with antipsychotic medications and risk of impaired next-day mental alertness, complex sleep-related behavior and dependence related to treatment with hypnotic medications were reviewed with the patient  The patient verbalized understanding and agreement for treatment  Patient's symptoms improved gradually over the hospital course  At the end of treatment the patient was doing well  Mood was stable at the time of discharge   The patient denied suicidal ideation, intent or plan at the time of discharge and denied homicidal ideation, intent or plan at the time of discharge  There was no overt psychosis at the time of discharge  Sleep and appetite were improved  The patient was tolerating medications and was not reporting any significant side effects at the time of discharge  Since Laina Mendoza was doing well at the end of the hospitalization, treatment team felt that Laina Mendoza could be safely discharged to outpatient care  The outpatient follow up with [facility] on 2/7/[facility] and [facility]  was arranged by the unit  upon discharge      Mental Status at Time of Discharge:     Appearance:  casually dressed, dressed appropriately, adequate grooming   Behavior:  normal   Speech:  normal rate and volume   Mood:  improved   Affect:  reactive, slightly brighter   Thought Process:  coherent, goal directed   Thought Content:  no overt delusions   Perceptual Disturbances: no auditory hallucinations, no visual hallucinations   Risk Potential: Suicidal ideation - None at present  Homicidal ideation - None  Potential for aggression - No   Sensorium:  person, place, time/date and situation   Cognition:  recent and remote memory grossly intact   Consciousness:  alert and awake    Attention: attention span and concentration are age appropriate   Insight:  improved   Judgment: improved   Gait/Station: normal gait/station, normal balance   Motor Activity: no abnormal movements     Admission Diagnosis:  Principal Problem:    Bipolar 2 disorder ([facility] )  Active Problems:    Tobacco use disorder    Diabetic ulcer of right midfoot ([facility] )    Type 2 diabetes mellitus with diabetic polyneuropathy, with long-term current use of insulin [facility] )    Essential hypertension    Cellulitis of right lower extremity    Acute kidney injury (NAIF) with acute tubular necrosis (ATN) ([facility])    Medical clearance for psychiatric admission    Hyperkalemia    History of electroconvulsive therapy      Discharge Diagnosis:     Principal Problem: Bipolar 2 disorder ([facility])  Active Problems:    Tobacco use disorder    Diabetic ulcer of right midfoot ([address] )    Type 2 diabetes mellitus with diabetic polyneuropathy, with long-term current use of insulin ([facility])    Essential hypertension    Cellulitis of right lower extremity    Acute kidney injury (NAIF) with acute tubular necrosis (ATN) ([facility])    Medical clearance for psychiatric admission    Hyperkalemia    History of electroconvulsive therapy  Resolved Problems:    * No resolved hospital problems   *      Lab results:    Admission on 01/23/2020   Component Date Value    POC Glucose 01/23/2020 189*    POC Glucose 01/24/2020 139     POC Glucose 01/24/2020 157*    POC Glucose 01/24/2020 99     Sodium 01/25/2020 139     Potassium 01/25/2020 3 9     Chloride 01/25/2020 103     CO2 01/25/2020 28     ANION GAP 01/25/2020 8     BUN 01/25/2020 12     Creatinine 01/25/2020 0 87     Glucose 01/25/2020 128*    Calcium 01/25/2020 8 9     AST 01/25/2020 80*    ALT 01/25/2020 54*    Alkaline Phosphatase 01/25/2020 74     Total Protein 01/25/2020 6 8     Albumin 01/25/2020 3 3     Total Bilirubin 01/25/2020 0 40     eGFR 01/25/2020 106     POC Glucose 01/25/2020 134     POC Glucose 01/25/2020 118     POC Glucose 01/25/2020 240*    POC Glucose 01/25/2020 214*    Platelets 92/07/7727 376     POC Glucose 01/26/2020 142*    POC Glucose 01/26/2020 238*    POC Glucose 01/26/2020 116     POC Glucose 01/26/2020 123     POC Glucose 01/26/2020 238*    POC Glucose 01/26/2020 207*    WBC 01/27/2020 8 70     RBC 01/27/2020 5 05     Hemoglobin 01/27/2020 13 7     Hematocrit 01/27/2020 40 8*    MCV 01/27/2020 81     MCH 01/27/2020 27 1     MCHC 01/27/2020 33 6     RDW 01/27/2020 14 0     MPV 01/27/2020 7 8*    Platelets 71/95/4000 402     Sodium 01/27/2020 134*    Potassium 01/27/2020 4 5     Chloride 01/27/2020 100     CO2 01/27/2020 24     ANION GAP 01/27/2020 10     BUN 01/27/2020 23     Creatinine 01/27/2020 1 26     Glucose 01/27/2020 254*    Calcium 01/27/2020 8 7     AST 01/27/2020 80*    ALT 01/27/2020 96*    Alkaline Phosphatase 01/27/2020 76     Total Protein 01/27/2020 7 1     Albumin 01/27/2020 3 5     Total Bilirubin 01/27/2020 0 30     eGFR 01/27/2020 69     Valproic Acid, Total 01/27/2020 46 6*    Segmented % 01/27/2020 54     Bands % 01/27/2020 2     Lymphocytes % 01/27/2020 33     Monocytes % 01/27/2020 7     Eosinophils, % 01/27/2020 1     Metamyelocytes% 01/27/2020 1     Myelocytes % 01/27/2020 2*    Neutrophils Absolute 01/27/2020 4 87     Lymphocytes Absolute 01/27/2020 2 87     Monocytes Absolute 01/27/2020 0 61     Eosinophils Absolute 01/27/2020 0 09     Total Counted 01/27/2020 100     RBC Morphology 01/27/2020 Normal     Platelet Estimate 48/59/9416 Increased*    POC Glucose 01/27/2020 220*    POC Glucose 01/27/2020 236*    POC Glucose 01/27/2020 224*    POC Glucose 01/27/2020 258*    Sodium 01/28/2020 135*    Potassium 01/28/2020 4 9     Chloride 01/28/2020 101     CO2 01/28/2020 26     ANION GAP 01/28/2020 8     BUN 01/28/2020 23     Creatinine 01/28/2020 0 99     Glucose 01/28/2020 241*    Glucose, Fasting 01/28/2020 241*    Calcium 01/28/2020 8 8     eGFR 01/28/2020 93     POC Glucose 01/28/2020 229*    POC Glucose 01/28/2020 251*    POC Glucose 01/28/2020 215*    POC Glucose 01/28/2020 238*    Sodium 01/29/2020 137     Potassium 01/29/2020 5 2*    Chloride 01/29/2020 98     CO2 01/29/2020 28     ANION GAP 01/29/2020 11     BUN 01/29/2020 25     Creatinine 01/29/2020 1 17     Glucose 01/29/2020 206*    Glucose, Fasting 01/29/2020 206*    Calcium 01/29/2020 9 6     AST 01/29/2020 37     ALT 01/29/2020 76*    Alkaline Phosphatase 01/29/2020 91     Total Protein 01/29/2020 7 5     Albumin 01/29/2020 3 8     Total Bilirubin 01/29/2020 0 30     eGFR 01/29/2020 76     POC Glucose 01/29/2020 191*    POC Glucose 01/29/2020 200*    POC Glucose 01/29/2020 267*    POC Glucose 01/29/2020 213*    Sodium 01/30/2020 138     Potassium 01/30/2020 4 9     Chloride 01/30/2020 102     CO2 01/30/2020 28     ANION GAP 01/30/2020 8     BUN 01/30/2020 21     Creatinine 01/30/2020 0 93     Glucose 01/30/2020 218*    Glucose, Fasting 01/30/2020 218*    Calcium 01/30/2020 9 1     AST 01/30/2020 29     ALT 01/30/2020 57*    Alkaline Phosphatase 01/30/2020 88     Total Protein 01/30/2020 6 3     Albumin 01/30/2020 3 1     Total Bilirubin 01/30/2020 0 20     eGFR 01/30/2020 100     Valproic Acid, Total 01/30/2020 <10 0*    POC Glucose 01/30/2020 206*    POC Glucose 01/30/2020 128     POC Glucose 01/30/2020 278*    POC Glucose 01/30/2020 253*    POC Glucose 01/31/2020 172*    POC Glucose 01/31/2020 157*    POC Glucose 01/31/2020 143*    POC Glucose 01/31/2020 271*    POC Glucose 02/01/2020 131     POC Glucose 02/01/2020 161*    POC Glucose 02/01/2020 183*    POC Glucose 02/01/2020 166*    POC Glucose 02/01/2020 255*    POC Glucose 02/02/2020 143*    POC Glucose 02/02/2020 151*    POC Glucose 02/02/2020 193*    POC Glucose 02/02/2020 280*    POC Glucose 02/03/2020 150*       Discharge Medications:    See after visit summary for reconciled discharge medications provided to patient and family  Paper scripts were given for 30 days and 1 RF  Patient was made aware the need to follow up with medical provider  Discharge instructions/Information to patient and family:     See after visit summary for information provided to patient and family  Provisions for Follow-Up Care:    See after visit summary for information related to follow-up care and any pertinent home health orders  Discharge Statement     I spent 25 minutes discharging the patient  This time was spent on the day of discharge  I had direct contact with the patient on the day of discharge         TAYLOR Arriola No indicators present

## 2020-03-01 ENCOUNTER — TRANSCRIPTION ENCOUNTER (OUTPATIENT)
Age: 20
End: 2020-03-01

## 2020-11-05 ENCOUNTER — EMERGENCY (EMERGENCY)
Facility: HOSPITAL | Age: 20
LOS: 1 days | Discharge: HOME | End: 2020-11-05
Attending: EMERGENCY MEDICINE | Admitting: EMERGENCY MEDICINE
Payer: COMMERCIAL

## 2020-11-05 VITALS
OXYGEN SATURATION: 99 % | HEART RATE: 95 BPM | RESPIRATION RATE: 20 BRPM | SYSTOLIC BLOOD PRESSURE: 144 MMHG | TEMPERATURE: 100 F | DIASTOLIC BLOOD PRESSURE: 82 MMHG

## 2020-11-05 VITALS
HEART RATE: 104 BPM | SYSTOLIC BLOOD PRESSURE: 141 MMHG | RESPIRATION RATE: 18 BRPM | HEIGHT: 65 IN | DIASTOLIC BLOOD PRESSURE: 87 MMHG | OXYGEN SATURATION: 98 % | TEMPERATURE: 98 F

## 2020-11-05 LAB
ALBUMIN SERPL ELPH-MCNC: 4.4 G/DL — SIGNIFICANT CHANGE UP (ref 3.3–5)
ALP SERPL-CCNC: 41 U/L — SIGNIFICANT CHANGE UP (ref 40–120)
ALT FLD-CCNC: 15 U/L — SIGNIFICANT CHANGE UP (ref 4–33)
ANION GAP SERPL CALC-SCNC: 11 MMO/L — SIGNIFICANT CHANGE UP (ref 7–14)
APTT BLD: 25.6 SEC — LOW (ref 27–36.3)
AST SERPL-CCNC: 16 U/L — SIGNIFICANT CHANGE UP (ref 4–32)
BILIRUB SERPL-MCNC: 0.4 MG/DL — SIGNIFICANT CHANGE UP (ref 0.2–1.2)
BUN SERPL-MCNC: 9 MG/DL — SIGNIFICANT CHANGE UP (ref 7–23)
CALCIUM SERPL-MCNC: 9.1 MG/DL — SIGNIFICANT CHANGE UP (ref 8.4–10.5)
CHLORIDE SERPL-SCNC: 102 MMOL/L — SIGNIFICANT CHANGE UP (ref 98–107)
CO2 SERPL-SCNC: 23 MMOL/L — SIGNIFICANT CHANGE UP (ref 22–31)
CREAT SERPL-MCNC: 1 MG/DL — SIGNIFICANT CHANGE UP (ref 0.5–1.3)
D DIMER BLD IA.RAPID-MCNC: 245 NG/ML — SIGNIFICANT CHANGE UP
GLUCOSE SERPL-MCNC: 87 MG/DL — SIGNIFICANT CHANGE UP (ref 70–99)
HCT VFR BLD CALC: 40.6 % — SIGNIFICANT CHANGE UP (ref 34.5–45)
HGB BLD-MCNC: 13.1 G/DL — SIGNIFICANT CHANGE UP (ref 11.5–15.5)
HIV COMBO RESULT: SIGNIFICANT CHANGE UP
HIV1+2 AB SPEC QL: SIGNIFICANT CHANGE UP
INR BLD: 1.06 — SIGNIFICANT CHANGE UP (ref 0.88–1.16)
MCHC RBC-ENTMCNC: 29.4 PG — SIGNIFICANT CHANGE UP (ref 27–34)
MCHC RBC-ENTMCNC: 32.3 % — SIGNIFICANT CHANGE UP (ref 32–36)
MCV RBC AUTO: 91.2 FL — SIGNIFICANT CHANGE UP (ref 80–100)
NRBC # FLD: 0 K/UL — SIGNIFICANT CHANGE UP (ref 0–0)
PLATELET # BLD AUTO: 347 K/UL — SIGNIFICANT CHANGE UP (ref 150–400)
PMV BLD: 9.1 FL — SIGNIFICANT CHANGE UP (ref 7–13)
POTASSIUM SERPL-MCNC: 4 MMOL/L — SIGNIFICANT CHANGE UP (ref 3.5–5.3)
POTASSIUM SERPL-SCNC: 4 MMOL/L — SIGNIFICANT CHANGE UP (ref 3.5–5.3)
PROT SERPL-MCNC: 7.8 G/DL — SIGNIFICANT CHANGE UP (ref 6–8.3)
PROTHROM AB SERPL-ACNC: 12.1 SEC — SIGNIFICANT CHANGE UP (ref 10.6–13.6)
RBC # BLD: 4.45 M/UL — SIGNIFICANT CHANGE UP (ref 3.8–5.2)
RBC # FLD: 13 % — SIGNIFICANT CHANGE UP (ref 10.3–14.5)
SODIUM SERPL-SCNC: 136 MMOL/L — SIGNIFICANT CHANGE UP (ref 135–145)
TROPONIN T, HIGH SENSITIVITY: < 6 NG/L — SIGNIFICANT CHANGE UP (ref ?–14)
WBC # BLD: 13.7 K/UL — HIGH (ref 3.8–10.5)
WBC # FLD AUTO: 13.7 K/UL — HIGH (ref 3.8–10.5)

## 2020-11-05 PROCEDURE — 71046 X-RAY EXAM CHEST 2 VIEWS: CPT | Mod: 26

## 2020-11-05 PROCEDURE — 99284 EMERGENCY DEPT VISIT MOD MDM: CPT

## 2020-11-05 PROCEDURE — 71275 CT ANGIOGRAPHY CHEST: CPT | Mod: 26

## 2020-11-05 RX ORDER — IBUPROFEN 200 MG
400 TABLET ORAL ONCE
Refills: 0 | Status: COMPLETED | OUTPATIENT
Start: 2020-11-05 | End: 2020-11-05

## 2020-11-05 RX ADMIN — Medication 400 MILLIGRAM(S): at 09:00

## 2020-11-05 NOTE — ED ADULT TRIAGE NOTE - CHIEF COMPLAINT QUOTE
Patient awoke this morning with SOB and midsternal chest pain.  Denies dizziness and weakness.  Respirations equal and unlabored.  No significant medical history.

## 2020-11-05 NOTE — ED PROVIDER NOTE - PROGRESS NOTE DETAILS
James Simmons MD:  Patient's pain improved with motrin, discussed results with patient who feels safe for discharge and PMD followup

## 2020-11-05 NOTE — ED ADULT NURSE REASSESSMENT NOTE - NS ED NURSE REASSESS COMMENT FT1
Report received from night RN. Pt awaiting HCG results for CTA clearance. Pt endorses continued chest pain at this time. MD at bedside updating pt. Will continue to monitor.

## 2020-11-05 NOTE — ED PROVIDER NOTE - CLINICAL SUMMARY MEDICAL DECISION MAKING FREE TEXT BOX
20 Y Female on OCPs presenting with sudden onset pleuritic chest pain. Concern for PE, Wells positive for PE#1 dx, HR>100. Differential to include pericarditis, ODTS, pneumonia. Low clinical suspicion for ACS, no risk factors, clinical picutre non-consistent, no acute EKG abnormalities compared to prior EKG. Common labs, Ddimer, trop, likely CTPA. Disposition pending results. 20 Y Female on OCPs presenting with sudden onset pleuritic chest pain. Concern for PE, Wells positive for PE#1 dx, HR>100. Differential to include pericarditis, ODTS, pneumonia. Low clinical suspicion for ACS, no risk factors, clinical picture non-consistent, no acute EKG abnormalities compared to prior EKG. Common labs, Ddimer, trop, likely CTPA. Disposition pending results.

## 2020-11-05 NOTE — ED PROVIDER NOTE - OBJECTIVE STATEMENT
20 Y F no PMH presenting with acute onset SOB, chest pain. States woke up at 3 am due to chest pain, central chest non-radiating, pleuritic, associated with 30 minutes of nausea, no vomiting or diarrhea. Pain remains present now, worse with reclining, unable to wait for urgent care hours due to intensity of pain. Concerned by recent use of old humidifier. Using oral OCPs, no history of malignancy, smoking, PE/DVT, fhx of sudden deaths. Denies any syncope, back pain, abdominal pain.

## 2020-11-05 NOTE — ED PROVIDER NOTE - PHYSICAL EXAMINATION
Physical Exam:  General: NAD, Conversive, worsened pain with reclining  Eyes: EOMI, Conjunctia and sclera clear  Neck: No JVD  Lungs: Clear to auscultation bilaterally, no wheeze, no rhonchi  Heart: tachycardic,  no murmurs  Abdomen: Soft, nontender, nondistended, no CVA tenderness  Extremities: 2+ peripheral pulses, no edema  Psych: AAO X3  Neurologic: Non-focal Physical Exam:  General: NAD, Conversive, worsened pain with reclining  Eyes: EOMI, Conjunctiva and sclera clear  Neck: No JVD  Lungs: Clear to auscultation bilaterally, no wheeze, no rhonchi  Heart: tachycardic,  no murmurs  Abdomen: Soft, nontender, nondistended, no CVA tenderness  Extremities: 2+ peripheral pulses, no edema  Psych: AAO X3  Neurologic: Non-focal

## 2020-11-05 NOTE — ED PROVIDER NOTE - NS ED ROS FT
GENERAL: No fever or chills  EYES: No change in vision  HEENT: No trouble swallowing or speaking  CARDIAC: + chest pain  PULMONARY: No cough + SOB  GI: No abdominal pain, + nausea no vomiting, no diarrhea or constipation  : No changes in urination  SKIN: No rashes  NEURO: No headache, no numbness  MSK: No joint pain  Otherwise as HPI or negative.

## 2020-11-05 NOTE — ED PROVIDER NOTE - ATTENDING CONTRIBUTION TO CARE
19YO F with no PMHx, no Fam h/o PE/DVT, p/w acute onset central pleuritic nonradiating chest pains with sob this morning.  Says pain worse when supine.  No syncope or diaphoresis. Uses OCPs but no recent surgery, travel, hospitalizations, or cancer hx.  Has had dry cough for about 1 month.    General: Patient in no apparent distress, AAO x 3  Skin: Dry and intact  HEENT: Head atraumatic. Oral mucosa moist. No pharyngeal exudates or tonsillar enlargement  Eyes: Conjunctiva normal  Cardiac: Regular rhythm and tachycardia. No pretibial edema b/l  Respiratory: Lungs clear b/l and symmetric. mild dyspnea but able to speak in complete sentences.  Gastrointestinal: Abdomen soft, nondistended, nontender  Musculoskeletal: Moves all extremities spontaneously  Neurological: alert and oriented to person, place, and time  Psychiatric: Cooperative    EKG sinus tachycardia    a/p  concern for PE vs viral syndrome/pericarditis   moderate risk Wells score-- will get CTPA scan, labs with trop, motrin for pain  will monitor in ED

## 2020-11-05 NOTE — ED PROVIDER NOTE - PATIENT PORTAL LINK FT
You can access the FollowMyHealth Patient Portal offered by [facility] by registering at the following website: [email or web address]. By joining [facility]’s FollowMyHealth portal, you will also be able to view your health information using other applications (apps) compatible with our system.

## 2020-11-05 NOTE — ED ADULT NURSE NOTE - OBJECTIVE STATEMENT
19y/o female aaox4 and ambulatory c/o chest discomfort. Pt states the discomfort woke her up accompanied by SOB. Pt states pain is worsened with laying flat and taking deep inspirations. Pt states she wanted to go to urgent care, but the pain was too bad. Pt states intermittent dizziness and nausea; denies vomiting. Pt states she takes birth control. Pt denies abdominal pain, vision changes, diarrhea, palpitations, fevers, chills, recent travel. Vital signs as noted. 20g in LAC; labs collected and sent as per MD order. Will continue to monitor.

## 2020-11-05 NOTE — ED PROVIDER NOTE - NSFOLLOWUPINSTRUCTIONS_ED_ALL_ED_FT
Chest Pain    Chest pain can be caused by many different conditions which may or may not be dangerous. Causes include heartburn, lung infections, heart attack, blood clot in lungs, skin infections, strain or damage to muscle, cartilage, or bones, etc. In addition to a history and physical examination, an electrocardiogram (ECG) or other lab tests may have been performed to determine the cause of your chest pain. Follow up with your primary care provider within the next 3-4 days. Take Motrin 600mg three times a day with food for the next 7 days.    SEEK IMMEDIATE MEDICAL CARE IF YOU HAVE ANY OF THE FOLLOWING SYMPTOMS: worsening chest pain, coughing up blood, unexplained back/neck/jaw pain, severe abdominal pain, dizziness or lightheadedness, fainting, shortness of breath, sweaty or clammy skin, vomiting, or racing heart beat. These symptoms may represent a serious problem that is an emergency. Do not wait to see if the symptoms will go away. Get medical help right away. Call 911 and do not drive yourself to the hospital.

## 2020-11-08 ENCOUNTER — EMERGENCY (EMERGENCY)
Facility: HOSPITAL | Age: 20
LOS: 1 days | Discharge: HOME | End: 2020-11-08
Attending: EMERGENCY MEDICINE | Admitting: EMERGENCY MEDICINE
Payer: COMMERCIAL

## 2020-11-08 VITALS
HEIGHT: 65 IN | TEMPERATURE: 97 F | HEART RATE: 94 BPM | DIASTOLIC BLOOD PRESSURE: 87 MMHG | RESPIRATION RATE: 18 BRPM | SYSTOLIC BLOOD PRESSURE: 142 MMHG | OXYGEN SATURATION: 99 %

## 2020-11-08 PROCEDURE — 99284 EMERGENCY DEPT VISIT MOD MDM: CPT

## 2020-11-08 RX ORDER — SODIUM CHLORIDE 9 MG/ML
1000 INJECTION INTRAMUSCULAR; INTRAVENOUS; SUBCUTANEOUS ONCE
Refills: 0 | Status: COMPLETED | OUTPATIENT
Start: 2020-11-08 | End: 2020-11-08

## 2020-11-08 RX ORDER — KETOROLAC TROMETHAMINE 30 MG/ML
15 SYRINGE (ML) INJECTION ONCE
Refills: 0 | Status: DISCONTINUED | OUTPATIENT
Start: 2020-11-08 | End: 2020-11-08

## 2020-11-08 RX ORDER — METOCLOPRAMIDE HCL 10 MG
10 TABLET ORAL ONCE
Refills: 0 | Status: COMPLETED | OUTPATIENT
Start: 2020-11-08 | End: 2020-11-08

## 2020-11-08 RX ADMIN — Medication 10 MILLIGRAM(S): at 18:18

## 2020-11-08 RX ADMIN — Medication 15 MILLIGRAM(S): at 18:18

## 2020-11-08 RX ADMIN — SODIUM CHLORIDE 1000 MILLILITER(S): 9 INJECTION INTRAMUSCULAR; INTRAVENOUS; SUBCUTANEOUS at 18:18

## 2020-11-08 NOTE — ED PROVIDER NOTE - PATIENT PORTAL LINK FT
You can access the FollowMyHealth Patient Portal offered by [facility] by registering at the following website: [email or web address]. By joining [employer]’s FollowMyHealth portal, you will also be able to view your health information using other applications (apps) compatible with our system.

## 2020-11-08 NOTE — ED PROVIDER NOTE - CLINICAL SUMMARY MEDICAL DECISION MAKING FREE TEXT BOX
21 y/o F c/o left sided chest discomfort, lightheadedness, nausea and vomiting. Prior ED visit 3 days ago with negative workup. Pt had a negative troponin, negative CT PA. Likely viral syndrome. Treat symptomatically with IV fluids and antiemetic. Pt has a PCP appointment tomorrow. Discharge home if PO challenge passed.

## 2020-11-08 NOTE — ED ADULT TRIAGE NOTE - CHIEF COMPLAINT QUOTE
pt with n pmh, c/o chest pain and episode of dizziness. pt states she was seen in ed 3 days ago with negative cardiac work up and CT of chest. was d/c home with motrin and told to f/u with PMD. respirations even and unlabored.

## 2020-11-08 NOTE — ED ADULT NURSE NOTE - NSIMPLEMENTINTERV_GEN_ALL_ED
Implemented All Universal Safety Interventions:  [place] to call system. Call bell, personal items and telephone within reach. Instruct patient to call for assistance. Room bathroom lighting operational. Non-slip footwear when patient is off stretcher. Physically safe environment: no spills, clutter or unnecessary equipment. Stretcher in lowest position, wheels locked, appropriate side rails in place.

## 2020-11-08 NOTE — ED PROVIDER NOTE - OBJECTIVE STATEMENT
l20 y/o F with no significant PMHx presents to the ED c/o left sided chest pain, lightheadedness, nausea, vomiting today. Similar to symptoms she had 3 days ago with lab workup and CT scan done here in the ED. Chest pain seemed to be improved, Mild relief to nausea. Denies fever, chills, cough, diarrhea, abdominal pain, vaginal bleeding, vaginal discharge, dysuria.

## 2020-11-08 NOTE — ED ADULT NURSE NOTE - OBJECTIVE STATEMENT
pt received to intake, a&ox 4, ambulatory, denies pmh. p/w chest discomfort, N/V since thursday. Pt endorses recent dx of pericarditis, not noted in documentation. Pt breathing even and unlabored on room air. Denies fever, chills, cough, SOB, chest pain, palpitations, dizziness constipation, numbness, tingling. Right 20G IV placed. Bolus running. Reglan and toradol given as ordered. pending dispo.

## 2022-03-19 ENCOUNTER — TRANSCRIPTION ENCOUNTER (OUTPATIENT)
Age: 22
End: 2022-03-19

## 2022-06-14 NOTE — ED PROVIDER NOTE - TOBACCO USE
Detail Level: Detailed Never smoker Depth Of Biopsy: dermis Was A Bandage Applied: Yes Size Of Lesion In Cm: 0 Biopsy Type: H and E Biopsy Method: Personna blade Anesthesia Type: 1% lidocaine with epinephrine Anesthesia Volume In Cc (Will Not Render If 0): 0.5 Hemostasis: Robby's Wound Care: Aquaphor Dressing: bandage Destruction After The Procedure: No Type Of Destruction Used: Curettage Curettage Text: The wound bed was treated with curettage after the biopsy was performed. Cryotherapy Text: The wound bed was treated with cryotherapy after the biopsy was performed. Electrodesiccation Text: The wound bed was treated with electrodesiccation after the biopsy was performed. Electrodesiccation And Curettage Text: The wound bed was treated with electrodesiccation and curettage after the biopsy was performed. Silver Nitrate Text: The wound bed was treated with silver nitrate after the biopsy was performed. Lab: 6 Lab Facility: 3 Consent: Written consent was obtained and risks were reviewed including but not limited to scarring, infection, bleeding, scabbing, incomplete removal, nerve damage and allergy to anesthesia. Post-Care Instructions: I reviewed with the patient in detail post-care instructions. Patient is to keep the biopsy site dry overnight, and then apply Vaseline or aquaphor ointment twice daily until healed. Notification Instructions: Patient will be notified of biopsy results. However, patient instructed to call the office if not contacted within 2 weeks. Billing Type: Third-Party Bill Information: Selecting Yes will display possible errors in your note based on the variables you have selected. This validation is only offered as a suggestion for you. PLEASE NOTE THAT THE VALIDATION TEXT WILL BE REMOVED WHEN YOU FINALIZE YOUR NOTE. IF YOU WANT TO FAX A PRELIMINARY NOTE YOU WILL NEED TO TOGGLE THIS TO 'NO' IF YOU DO NOT WANT IT IN YOUR FAXED NOTE.

## 2022-07-02 ENCOUNTER — NON-APPOINTMENT (OUTPATIENT)
Age: 22
End: 2022-07-02

## 2022-07-26 ENCOUNTER — NON-APPOINTMENT (OUTPATIENT)
Age: 22
End: 2022-07-26

## 2022-12-04 ENCOUNTER — NON-APPOINTMENT (OUTPATIENT)
Age: 22
End: 2022-12-04

## 2023-04-11 NOTE — ED PROVIDER NOTE - PHYSICAL EXAMINATION
DC instructions no LE edema, normal equal distal pulses.  unofficial bedside US: grossly normal LV function, no RV dilation. Possible LVH? a-line predominant  very minimal tachypnea, poor inspiratory effort.

## 2023-07-26 ENCOUNTER — APPOINTMENT (OUTPATIENT)
Age: 23
End: 2023-07-26

## 2023-10-13 ENCOUNTER — APPOINTMENT (OUTPATIENT)
Age: 23
End: 2023-10-13

## 2024-01-23 NOTE — ED PROVIDER NOTE - NSCAREINITIATED _GEN_ER
"Pt is here today for an on-going sore throat for the past month.   States that it is painful for her to swallow.     /78   Pulse 104   Temp 97.4  F (36.3  C) (Temporal)   Resp 16   Ht 1.6 m (5' 3\")   Wt 81.6 kg (180 lb)   SpO2 97%   BMI 31.89 kg/m    Nicci Santoyo RN on 1/22/2024 at 9:22 PM       Triage Assessment (Adult)       Row Name 01/22/24 2121          Triage Assessment    Airway WDL WDL        Respiratory WDL    Respiratory WDL WDL        Skin Circulation/Temperature WDL    Skin Circulation/Temperature WDL WDL        Cardiac WDL    Cardiac WDL WDL        Peripheral/Neurovascular WDL    Peripheral Neurovascular WDL WDL        Cognitive/Neuro/Behavioral WDL    Cognitive/Neuro/Behavioral WDL WDL        [place] Coma Scale    Best Eye Response 4-->(E4) spontaneous     Best Motor Response 6-->(M6) obeys commands     Best Verbal Response 5-->(V5) oriented     [place] Coma Scale Score 15                     " Jamal Davis)

## 2024-03-08 ENCOUNTER — EMERGENCY (EMERGENCY)
Facility: HOSPITAL | Age: 24
LOS: 1 days | Discharge: HOME | End: 2024-03-08
Attending: EMERGENCY MEDICINE
Payer: COMMERCIAL

## 2024-03-08 VITALS
OXYGEN SATURATION: 100 % | RESPIRATION RATE: 18 BRPM | WEIGHT: 259.93 LBS | DIASTOLIC BLOOD PRESSURE: 88 MMHG | SYSTOLIC BLOOD PRESSURE: 129 MMHG | HEIGHT: 65 IN | TEMPERATURE: 99 F | HEART RATE: 89 BPM

## 2024-03-08 PROCEDURE — 99285 EMERGENCY DEPT VISIT HI MDM: CPT

## 2024-03-09 VITALS
RESPIRATION RATE: 18 BRPM | HEART RATE: 93 BPM | TEMPERATURE: 99 F | OXYGEN SATURATION: 100 % | SYSTOLIC BLOOD PRESSURE: 123 MMHG | DIASTOLIC BLOOD PRESSURE: 76 MMHG

## 2024-03-09 LAB
ALBUMIN SERPL ELPH-MCNC: 4 G/DL — SIGNIFICANT CHANGE UP (ref 3.3–5)
ALP SERPL-CCNC: 52 U/L — SIGNIFICANT CHANGE UP (ref 40–120)
ALT FLD-CCNC: 24 U/L — SIGNIFICANT CHANGE UP (ref 10–45)
ANION GAP SERPL CALC-SCNC: 9 MMOL/L — SIGNIFICANT CHANGE UP (ref 5–17)
AST SERPL-CCNC: 21 U/L — SIGNIFICANT CHANGE UP (ref 10–40)
BASOPHILS # BLD AUTO: 0.04 K/UL — SIGNIFICANT CHANGE UP (ref 0–0.2)
BASOPHILS NFR BLD AUTO: 0.6 % — SIGNIFICANT CHANGE UP (ref 0–2)
BILIRUB SERPL-MCNC: 0.1 MG/DL — LOW (ref 0.2–1.2)
BUN SERPL-MCNC: 10 MG/DL — SIGNIFICANT CHANGE UP (ref 7–23)
CALCIUM SERPL-MCNC: 8.8 MG/DL — SIGNIFICANT CHANGE UP (ref 8.4–10.5)
CHLORIDE SERPL-SCNC: 105 MMOL/L — SIGNIFICANT CHANGE UP (ref 96–108)
CO2 SERPL-SCNC: 25 MMOL/L — SIGNIFICANT CHANGE UP (ref 22–31)
CREAT SERPL-MCNC: 0.96 MG/DL — SIGNIFICANT CHANGE UP (ref 0.5–1.3)
D DIMER BLD IA.RAPID-MCNC: 161 NG/ML DDU — SIGNIFICANT CHANGE UP
EGFR: 84 ML/MIN/1.73M2 — SIGNIFICANT CHANGE UP
EOSINOPHIL # BLD AUTO: 0.13 K/UL — SIGNIFICANT CHANGE UP (ref 0–0.5)
EOSINOPHIL NFR BLD AUTO: 2.1 % — SIGNIFICANT CHANGE UP (ref 0–6)
FLUAV AG NPH QL: SIGNIFICANT CHANGE UP
FLUBV AG NPH QL: SIGNIFICANT CHANGE UP
GLUCOSE SERPL-MCNC: 88 MG/DL — SIGNIFICANT CHANGE UP (ref 70–99)
HCG SERPL-ACNC: <2 MIU/ML — SIGNIFICANT CHANGE UP
HCT VFR BLD CALC: 36.6 % — SIGNIFICANT CHANGE UP (ref 34.5–45)
HGB BLD-MCNC: 12.1 G/DL — SIGNIFICANT CHANGE UP (ref 11.5–15.5)
IMM GRANULOCYTES NFR BLD AUTO: 0.2 % — SIGNIFICANT CHANGE UP (ref 0–0.9)
LYMPHOCYTES # BLD AUTO: 4.03 K/UL — HIGH (ref 1–3.3)
LYMPHOCYTES # BLD AUTO: 64.4 % — HIGH (ref 13–44)
MCHC RBC-ENTMCNC: 29.9 PG — SIGNIFICANT CHANGE UP (ref 27–34)
MCHC RBC-ENTMCNC: 33.1 GM/DL — SIGNIFICANT CHANGE UP (ref 32–36)
MCV RBC AUTO: 90.4 FL — SIGNIFICANT CHANGE UP (ref 80–100)
MONOCYTES # BLD AUTO: 0.45 K/UL — SIGNIFICANT CHANGE UP (ref 0–0.9)
MONOCYTES NFR BLD AUTO: 7.2 % — SIGNIFICANT CHANGE UP (ref 2–14)
NEUTROPHILS # BLD AUTO: 1.6 K/UL — LOW (ref 1.8–7.4)
NEUTROPHILS NFR BLD AUTO: 25.5 % — LOW (ref 43–77)
NRBC # BLD: 0 /100 WBCS — SIGNIFICANT CHANGE UP (ref 0–0)
PLATELET # BLD AUTO: 312 K/UL — SIGNIFICANT CHANGE UP (ref 150–400)
POTASSIUM SERPL-MCNC: 4.4 MMOL/L — SIGNIFICANT CHANGE UP (ref 3.5–5.3)
POTASSIUM SERPL-SCNC: 4.4 MMOL/L — SIGNIFICANT CHANGE UP (ref 3.5–5.3)
PROT SERPL-MCNC: 7.3 G/DL — SIGNIFICANT CHANGE UP (ref 6–8.3)
RBC # BLD: 4.05 M/UL — SIGNIFICANT CHANGE UP (ref 3.8–5.2)
RBC # FLD: 13.1 % — SIGNIFICANT CHANGE UP (ref 10.3–14.5)
RSV RNA NPH QL NAA+NON-PROBE: SIGNIFICANT CHANGE UP
SARS-COV-2 RNA SPEC QL NAA+PROBE: SIGNIFICANT CHANGE UP
SODIUM SERPL-SCNC: 139 MMOL/L — SIGNIFICANT CHANGE UP (ref 135–145)
TROPONIN T, HIGH SENSITIVITY RESULT: <6 NG/L — SIGNIFICANT CHANGE UP (ref 0–51)
WBC # BLD: 6.26 K/UL — SIGNIFICANT CHANGE UP (ref 3.8–10.5)
WBC # FLD AUTO: 6.26 K/UL — SIGNIFICANT CHANGE UP (ref 3.8–10.5)

## 2024-03-09 PROCEDURE — 80053 COMPREHEN METABOLIC PANEL: CPT

## 2024-03-09 PROCEDURE — 84702 CHORIONIC GONADOTROPIN TEST: CPT

## 2024-03-09 PROCEDURE — 87637 SARSCOV2&INF A&B&RSV AMP PRB: CPT

## 2024-03-09 PROCEDURE — 85379 FIBRIN DEGRADATION QUANT: CPT

## 2024-03-09 PROCEDURE — 84484 ASSAY OF TROPONIN QUANT: CPT

## 2024-03-09 PROCEDURE — 99285 EMERGENCY DEPT VISIT HI MDM: CPT | Mod: 25

## 2024-03-09 PROCEDURE — 85025 COMPLETE CBC W/AUTO DIFF WBC: CPT

## 2024-03-09 PROCEDURE — 93005 ELECTROCARDIOGRAM TRACING: CPT

## 2024-03-09 PROCEDURE — 71045 X-RAY EXAM CHEST 1 VIEW: CPT

## 2024-03-09 PROCEDURE — 71045 X-RAY EXAM CHEST 1 VIEW: CPT | Mod: 26

## 2024-03-09 NOTE — ED PROVIDER NOTE - PATIENT PORTAL LINK FT
You can access the FollowMyHealth Patient Portal offered by [facility] by registering at the following website: [email or web address]. By joining [facility] [employer]’s FollowMyHealth portal, you will also be able to view your health information using other applications (apps) compatible with our system.

## 2024-03-09 NOTE — ED PROVIDER NOTE - CLINICAL SUMMARY MEDICAL DECISION MAKING FREE TEXT BOX
23-year-old female no significant medical history presenting for chest pain that began on Monday.    The patient denies fevers, chills, nausea, vomiting, shortness of breath, headache, visual changes, urinary symptoms.    VSS.  PE.  No acute findings.  The differential is not limited to ACS, electrolyte/hematological derangement, pneumonia, PE (will obtain D-dimer), low concern at this time for aortic dissection.  Will obtain basic labs, cardiac labs, D-dimer, chest x-ray, EKG.  Dispo pending labs imaging and reassessment.

## 2024-03-09 NOTE — ED ADULT NURSE NOTE - NSFALLUNIVINTERV_ED_ALL_ED
Bed/Stretcher in lowest position, wheels locked, appropriate side rails in place/Call bell, personal items and telephone in reach/Instruct patient to call for assistance before getting out of bed/chair/stretcher/Non-slip footwear applied when patient is off stretcher/[place] to call system/Physically safe environment - no spills, clutter or unnecessary equipment/Purposeful proactive rounding/Room/bathroom lighting operational, light cord in reach

## 2024-03-09 NOTE — ED PROVIDER NOTE - OBJECTIVE STATEMENT
23-year-old female no significant medical history presenting for chest pain that began on Monday.  The patient reports that her chest pain started off on the right side now is migrating to the left.  The patient reports her chest pain is not made worse with exertion.  The patient reports that she has never had symptoms like this before.  The patient has not taken anything for her symptoms.  The patient does report OCP use but denies any recent travels.  The patient denies fevers, chills, nausea, vomiting, shortness of breath, headache, visual changes, urinary symptoms.

## 2024-03-09 NOTE — ED PROVIDER NOTE - ATTENDING CONTRIBUTION TO CARE
This is a 23-year-old female otherwise well on oral contraception pills non-smoker coming in with 2 daysOf chest pain radiating to the left shoulder with no shortness of breath no leg swelling no pleuritic symptoms.  It is intermittent and was not present when I was examining her.  No fevers chills URI symptoms.  No sick contacts.  Never had this pain before.  Regular rate and rhythm.  Clear lungs.  Nontender abdomen.  Full range of motion left shoulder.  Clinically it is unlikely that the patient has aortic dissection.  Will rule out pulmonary embolism and acute coronary syndrome.  CBC CMP cardiac enzyme as well as D-dimer.      MVAIS [place]: I have indepentantly reviewed and interpreted the following film/image/EKG tracing as noted (please review the medical record for a definitive / final report which may follow my interpretation): ekg - nsr, no stemi      MAVIS [place]: I have indepentantly reviewed and interpreted the following film/image/EKG tracing as noted (please review the medical record for a definitive / final report which may follow my interpretation): xr chest - no pna or ptx

## 2024-03-09 NOTE — ED PROVIDER NOTE - NSFOLLOWUPINSTRUCTIONS_ED_ALL_ED_FT
Please follow up with your primary care physician within 2-3 days.   Return to the ER for any new or concerning symptoms.     You may take 650 mg of acetaminophen or 600 mg of Motrin every six hours as needed for pain.   Drink plenty of fluids and rest.      Chest Pain    WHAT YOU NEED TO KNOW:    What do I need to know about chest pain? Chest pain can be caused by a range of conditions, from not serious to life-threatening. It is important to follow up with your healthcare provider to find the cause of your chest pain.    What may cause or increase my risk for chest pain?    A digestion problem, such as acid reflux or a stomach ulcer    An anxiety attack or a strong emotion, such as anger    Infection, inflammation, or a fracture in the bones or cartilage in your chest    Poor blood flow to your heart (angina)    A life-threatening condition, such as a heart attack or blood clot in your lungs  What other symptoms might I have with chest pain?    A burning feeling behind your breastbone    A racing or slow heartbeat    Fever or sweating    Nausea or vomiting    Shortness of breath    Discomfort or pressure that spreads from your chest to your back, jaw, or arm    Feeling weak, tired, or faint  How is the cause of chest pain diagnosed? Your healthcare provider will examine you. Describe your chest pain in as much detail as possible. Tell him or her where your pain is and when it began. Tell the provider if you notice anything that makes the pain worse or better. Tell him or her if it is constant or comes and goes. Also include any other symptoms you have with the chest pain, such as sweating or nausea. Your provider will ask about any medicines you use and medical conditions you have. Tell him or her if you have a family history of heart disease. You may also need any of the following tests:    An EKG is a test that records your heart's electrical activity.    Blood tests check for heart damage and signs of a heart attack.    An echocardiogram uses sound waves to see if blood is flowing normally through your heart.    An ultrasound, x-ray, CT, or MRI scan may show the cause of your chest pain. You may be given contrast liquid to help your heart show up better in the pictures. Tell the healthcare provider if you have ever had an allergic reaction to contrast liquid. Do not enter the MRI room with anything metal. Metal can cause serious injury. Tell the provider if you have any metal in or on your body.    An endoscopy may be done to check for ulcers or problem with your esophagus.  Upper Endoscopy  How is chest pain treated?    Medicines may be given to treat the cause of your chest pain. Examples include pain medicine, anxiety medicine, or medicines to increase blood flow to your heart. Do not take certain medicines without asking your healthcare provider first. These include NSAIDs, herbal or vitamin supplements, and hormones, such as estrogen or progestin.    A stent may be placed if your chest pain is caused by blockage in your heart. A stent is a wire mesh tube that helps hold your artery open. You may need more than 1 stent.  Coronary Artery Angioplasty (Stent)  What are some healthy living tips? If the cause of your chest pain is known, your healthcare provider will give you specific guidelines to follow. The following are general healthy guidelines:    Do not smoke. Nicotine and other chemicals in cigarettes and cigars can cause lung and heart damage. Ask your healthcare provider for information if you currently smoke and need help to quit. E-cigarettes or smokeless tobacco still contain nicotine. Talk to your healthcare provider before you use these products.    Choose a variety of healthy foods as often as possible. Include fresh, frozen, or canned fruits and vegetables. Also include low-fat dairy products, fish, chicken (without skin), and lean meats. Your healthcare provider or a dietitian can help you create meal plans. You may need to avoid certain foods or drinks if your pain is caused by a digestion problem.  Healthy Foods      Lower your sodium (salt) intake. Limit foods that are high in sodium, such as canned foods, salty snacks, and cold cuts. If you add salt when you cook food, do not add more at the table. Choose low-sodium canned foods as much as possible.        Drink plenty of water every day. Water helps your body to control your temperature and blood pressure. Ask your healthcare provider how much water you should drink every day.    Ask about activity. Your healthcare provider will tell you which activities to limit or avoid. Ask when you can drive, return to work, and have sex. Ask about the best exercise plan for you.    Maintain a healthy weight. Ask your healthcare provider what a healthy weight is for you. Ask him or her to help you create a weight loss plan if you are overweight.    Ask about vaccines you may need. Your healthcare provider can tell you which vaccines you need, and when to get them. The following vaccines help prevent diseases that can become serious for a person with a heart condition:  The influenza (flu) vaccine is given each year. Get a flu vaccine as soon as recommended, usually in September or October.    The pneumonia vaccine is usually given every 5 years. Your healthcare provider may recommend the pneumonia vaccine if you are 65 or older.    COVID-19 vaccines are given to adults as a shot in 1 or 2 doses. Vaccination is recommended for all adults. A booster (additional) dose is also recommended for all adults. A second booster is recommended for all adults 50 or older and for immunocompromised adults 18 or older. The second booster is also recommended for adults who received the 1-dose vaccine for the first and booster doses. Your healthcare provider can tell you when to get one or both boosters.  Prevent Heart Disease   Call your local emergency number (911 in the [place]) or have someone call if:    You have any of the following signs of a heart attack:  Squeezing, pressure, or pain in your chest    You may also have any of the following:  Discomfort or pain in your back, neck, jaw, stomach, or arm    Shortness of breath    Nausea or vomiting    Lightheadedness or a sudden cold sweat    When should I seek immediate care?    You have chest discomfort that gets worse, even with medicine.    You cough or vomit blood.    Your bowel movements are black or bloody.    You cannot stop vomiting, or it hurts to swallow.  When should I call my doctor?    You have questions or concerns about your condition or care.

## 2024-03-09 NOTE — ED ADULT NURSE NOTE - OBJECTIVE STATEMENT
23 y.o F PMH acid reflux presenting to the ED for intermittent L sided chest pain radiating to the L shoulder. Pt states that she noticed the pain on the R side of the chest on 3/4 but it has not shifted to the L side. Pt reports that nothing makes the pain worse or better. Pt initially thought that it was acid reflux, took meds at home for it with no improvement. Denies SOB, n/v/d, dizziness, weakness, cough, congestion, runny nose, urinary symptoms. AOx4, MAEx4, respirations even and unlabored, abd soft nontender/nondistended, skin warm dry and normal for race. Patient safety maintained, bed is in lowest position, wheels locked, and side rails raised. Patient oriented to call bell, and call bell is within reach.

## 2024-05-07 NOTE — ED PROVIDER NOTE - CARE PLAN
Quality 431: Preventive Care And Screening: Unhealthy Alcohol Use - Screening: Patient not identified as an unhealthy alcohol user when screened for unhealthy alcohol use using a systematic screening method Quality 130: Documentation Of Current Medications In The Medical Record: Current Medications Documented Detail Level: Detailed Quality 226: Preventive Care And Screening: Tobacco Use: Screening And Cessation Intervention: Patient screened for tobacco use and is an ex/non-smoker Quality 47: Advance Care Plan: Advance Care Planning discussed and documented in the medical record; patient did not wish or was not able to name a surrogate decision maker or provide an advance care plan. Principal Discharge DX:	Chest pain  Secondary Diagnosis:	Nausea

## 2025-04-01 NOTE — ED ADULT TRIAGE NOTE - CHIEF COMPLAINT QUOTE
Hide Additional Notes?: No Detail Level: Zone pt arrives w/ flu-like symptoms. pt states was here yesterday for same complaint and gvn a z-pack. pt states started pack and took one pill already but states symptoms getting worst.